# Patient Record
Sex: MALE | Race: WHITE | Employment: FULL TIME | URBAN - METROPOLITAN AREA
[De-identification: names, ages, dates, MRNs, and addresses within clinical notes are randomized per-mention and may not be internally consistent; named-entity substitution may affect disease eponyms.]

---

## 2017-01-27 ENCOUNTER — OFFICE VISIT (OUTPATIENT)
Dept: FAMILY MEDICINE CLINIC | Facility: CLINIC | Age: 57
End: 2017-01-27

## 2017-01-27 VITALS
SYSTOLIC BLOOD PRESSURE: 134 MMHG | TEMPERATURE: 99 F | BODY MASS INDEX: 32.78 KG/M2 | HEART RATE: 87 BPM | DIASTOLIC BLOOD PRESSURE: 77 MMHG | HEIGHT: 70 IN | WEIGHT: 229 LBS

## 2017-01-27 DIAGNOSIS — J01.00 ACUTE NON-RECURRENT MAXILLARY SINUSITIS: Primary | ICD-10-CM

## 2017-01-27 PROCEDURE — 99212 OFFICE O/P EST SF 10 MIN: CPT | Performed by: PHYSICIAN ASSISTANT

## 2017-01-27 PROCEDURE — 99213 OFFICE O/P EST LOW 20 MIN: CPT | Performed by: PHYSICIAN ASSISTANT

## 2017-01-27 RX ORDER — AMOXICILLIN AND CLAVULANATE POTASSIUM 875; 125 MG/1; MG/1
1 TABLET, FILM COATED ORAL 2 TIMES DAILY
Qty: 20 TABLET | Refills: 0 | Status: SHIPPED | OUTPATIENT
Start: 2017-01-27 | End: 2017-02-06

## 2017-01-27 RX ORDER — METHYLPREDNISOLONE 4 MG/1
TABLET ORAL
Qty: 1 KIT | Refills: 0 | Status: SHIPPED | OUTPATIENT
Start: 2017-01-27 | End: 2018-01-26 | Stop reason: ALTCHOICE

## 2017-01-27 NOTE — PROGRESS NOTES
HPI:    Patient ID: Jules Gonzales is a 55year old male. HPI Comments: Patient presents for sinus congestion, pressure in head and productive cough for past 3-4 days. Patient states has had fever, chills and body aches.   He denies dizziness, nausea, vo PHYSICAL EXAM:   Physical Exam   Constitutional: He is oriented to person, place, and time. He appears well-developed and well-nourished. No distress. HENT:   Head: Normocephalic and atraumatic.    Right Ear: Tympanic membrane and ear canal normal.   Sharron Overall

## 2017-12-04 RX ORDER — NEBIVOLOL HYDROCHLORIDE 10 MG/1
TABLET ORAL
Qty: 30 TABLET | Refills: 0 | Status: SHIPPED | OUTPATIENT
Start: 2017-12-04 | End: 2018-01-19

## 2017-12-04 NOTE — TELEPHONE ENCOUNTER
Signed Prescriptions Disp Refills    BYSTOLIC 10 MG Oral Tab 30 tablet 0      Sig: TAKE 1 TABLET BY MOUTH  DAILY        Authorizing Provider: Vicente Gonzales        Ordering User: Celso Mace           Refill approved per clinical decision.         H

## 2017-12-29 RX ORDER — LOSARTAN POTASSIUM AND HYDROCHLOROTHIAZIDE 12.5; 1 MG/1; MG/1
TABLET ORAL
Qty: 90 TABLET | Refills: 0 | Status: SHIPPED | OUTPATIENT
Start: 2017-12-29 | End: 2018-01-26

## 2018-01-23 RX ORDER — NEBIVOLOL HYDROCHLORIDE 10 MG/1
TABLET ORAL
Qty: 30 TABLET | Refills: 0 | Status: SHIPPED | OUTPATIENT
Start: 2018-01-23 | End: 2018-01-26

## 2018-01-26 ENCOUNTER — LAB ENCOUNTER (OUTPATIENT)
Dept: LAB | Age: 58
End: 2018-01-26
Attending: PHYSICIAN ASSISTANT
Payer: COMMERCIAL

## 2018-01-26 ENCOUNTER — OFFICE VISIT (OUTPATIENT)
Dept: FAMILY MEDICINE CLINIC | Facility: CLINIC | Age: 58
End: 2018-01-26

## 2018-01-26 ENCOUNTER — APPOINTMENT (OUTPATIENT)
Dept: LAB | Age: 58
End: 2018-01-26
Attending: PHYSICIAN ASSISTANT
Payer: COMMERCIAL

## 2018-01-26 VITALS
WEIGHT: 250 LBS | HEIGHT: 70 IN | HEART RATE: 58 BPM | SYSTOLIC BLOOD PRESSURE: 129 MMHG | DIASTOLIC BLOOD PRESSURE: 78 MMHG | BODY MASS INDEX: 35.79 KG/M2 | TEMPERATURE: 98 F

## 2018-01-26 DIAGNOSIS — E78.5 HYPERLIPIDEMIA, UNSPECIFIED HYPERLIPIDEMIA TYPE: ICD-10-CM

## 2018-01-26 DIAGNOSIS — I10 ESSENTIAL HYPERTENSION: ICD-10-CM

## 2018-01-26 DIAGNOSIS — Z00.00 ROUTINE GENERAL MEDICAL EXAMINATION AT A HEALTH CARE FACILITY: ICD-10-CM

## 2018-01-26 DIAGNOSIS — K21.9 GASTROESOPHAGEAL REFLUX DISEASE WITHOUT ESOPHAGITIS: ICD-10-CM

## 2018-01-26 DIAGNOSIS — Z00.00 ROUTINE GENERAL MEDICAL EXAMINATION AT A HEALTH CARE FACILITY: Primary | ICD-10-CM

## 2018-01-26 PROBLEM — E66.01 SEVERE OBESITY (BMI 35.0-35.9 WITH COMORBIDITY): Status: ACTIVE | Noted: 2018-01-26

## 2018-01-26 PROBLEM — J01.00 ACUTE NON-RECURRENT MAXILLARY SINUSITIS: Status: RESOLVED | Noted: 2017-01-27 | Resolved: 2018-01-26

## 2018-01-26 PROBLEM — G47.33 OBSTRUCTIVE SLEEP APNEA: Status: ACTIVE | Noted: 2018-01-26

## 2018-01-26 PROBLEM — E66.01 SEVERE OBESITY (BMI 35.0-35.9 WITH COMORBIDITY) (HCC): Status: ACTIVE | Noted: 2018-01-26

## 2018-01-26 PROBLEM — E66.01 SEVERE OBESITY (BMI 35.0-35.9 WITH COMORBIDITY)  (HCC): Status: ACTIVE | Noted: 2018-01-26

## 2018-01-26 LAB
ALBUMIN SERPL BCP-MCNC: 3.6 G/DL (ref 3.5–4.8)
ALBUMIN/GLOB SERPL: 1.1 {RATIO} (ref 1–2)
ALP SERPL-CCNC: 36 U/L (ref 32–100)
ALT SERPL-CCNC: 49 U/L (ref 17–63)
ANION GAP SERPL CALC-SCNC: 10 MMOL/L (ref 0–18)
AST SERPL-CCNC: 30 U/L (ref 15–41)
BASOPHILS # BLD: 0.1 K/UL (ref 0–0.2)
BASOPHILS NFR BLD: 1 %
BILIRUB SERPL-MCNC: 0.9 MG/DL (ref 0.3–1.2)
BUN SERPL-MCNC: 13 MG/DL (ref 8–20)
BUN/CREAT SERPL: 13.4 (ref 10–20)
CALCIUM SERPL-MCNC: 9.2 MG/DL (ref 8.5–10.5)
CHLORIDE SERPL-SCNC: 101 MMOL/L (ref 95–110)
CHOLEST SERPL-MCNC: 153 MG/DL (ref 110–200)
CO2 SERPL-SCNC: 26 MMOL/L (ref 22–32)
CREAT SERPL-MCNC: 0.97 MG/DL (ref 0.5–1.5)
EOSINOPHIL # BLD: 0.1 K/UL (ref 0–0.7)
EOSINOPHIL NFR BLD: 1 %
ERYTHROCYTE [DISTWIDTH] IN BLOOD BY AUTOMATED COUNT: 13.5 % (ref 11–15)
GLOBULIN PLAS-MCNC: 3.3 G/DL (ref 2.5–3.7)
GLUCOSE SERPL-MCNC: 97 MG/DL (ref 70–99)
HCT VFR BLD AUTO: 45 % (ref 41–52)
HDLC SERPL-MCNC: 31 MG/DL
HGB BLD-MCNC: 15 G/DL (ref 13.5–17.5)
LDLC SERPL CALC-MCNC: 94 MG/DL (ref 0–99)
LYMPHOCYTES # BLD: 1.6 K/UL (ref 1–4)
LYMPHOCYTES NFR BLD: 17 %
MCH RBC QN AUTO: 29.4 PG (ref 27–32)
MCHC RBC AUTO-ENTMCNC: 33.4 G/DL (ref 32–37)
MCV RBC AUTO: 88.1 FL (ref 80–100)
MONOCYTES # BLD: 1 K/UL (ref 0–1)
MONOCYTES NFR BLD: 10 %
NEUTROPHILS # BLD AUTO: 6.5 K/UL (ref 1.8–7.7)
NEUTROPHILS NFR BLD: 71 %
NONHDLC SERPL-MCNC: 122 MG/DL
OSMOLALITY UR CALC.SUM OF ELEC: 284 MOSM/KG (ref 275–295)
PLATELET # BLD AUTO: 237 K/UL (ref 140–400)
PMV BLD AUTO: 10 FL (ref 7.4–10.3)
POTASSIUM SERPL-SCNC: 3.8 MMOL/L (ref 3.3–5.1)
PROT SERPL-MCNC: 6.9 G/DL (ref 5.9–8.4)
PSA SERPL-MCNC: 3.2 NG/ML (ref 0–4)
RBC # BLD AUTO: 5.11 M/UL (ref 4.5–5.9)
SODIUM SERPL-SCNC: 137 MMOL/L (ref 136–144)
TRIGL SERPL-MCNC: 140 MG/DL (ref 1–149)
TSH SERPL-ACNC: 1.05 UIU/ML (ref 0.45–5.33)
WBC # BLD AUTO: 9.2 K/UL (ref 4–11)

## 2018-01-26 PROCEDURE — 93010 ELECTROCARDIOGRAM REPORT: CPT | Performed by: PHYSICIAN ASSISTANT

## 2018-01-26 PROCEDURE — 93005 ELECTROCARDIOGRAM TRACING: CPT

## 2018-01-26 PROCEDURE — 85025 COMPLETE CBC W/AUTO DIFF WBC: CPT

## 2018-01-26 PROCEDURE — 36415 COLL VENOUS BLD VENIPUNCTURE: CPT

## 2018-01-26 PROCEDURE — 99214 OFFICE O/P EST MOD 30 MIN: CPT | Performed by: PHYSICIAN ASSISTANT

## 2018-01-26 PROCEDURE — 99212 OFFICE O/P EST SF 10 MIN: CPT | Performed by: PHYSICIAN ASSISTANT

## 2018-01-26 PROCEDURE — 80061 LIPID PANEL: CPT

## 2018-01-26 PROCEDURE — 84443 ASSAY THYROID STIM HORMONE: CPT

## 2018-01-26 PROCEDURE — 80053 COMPREHEN METABOLIC PANEL: CPT

## 2018-01-26 RX ORDER — LOSARTAN POTASSIUM AND HYDROCHLOROTHIAZIDE 12.5; 1 MG/1; MG/1
1 TABLET ORAL
Qty: 90 TABLET | Refills: 4 | Status: SHIPPED | OUTPATIENT
Start: 2018-01-26 | End: 2019-02-20

## 2018-01-26 RX ORDER — NEBIVOLOL 10 MG/1
10 TABLET ORAL
Qty: 90 TABLET | Refills: 4 | Status: SHIPPED | OUTPATIENT
Start: 2018-01-26 | End: 2019-02-01

## 2018-01-26 RX ORDER — ALBUTEROL SULFATE 90 UG/1
AEROSOL, METERED RESPIRATORY (INHALATION)
Qty: 25.5 G | Refills: 3 | Status: SHIPPED | OUTPATIENT
Start: 2018-01-26 | End: 2019-02-20

## 2018-01-26 NOTE — PROGRESS NOTES
HPI:    Patient ID: Saima Ogden is a 65year old male. Patient presents for annual visit and medication refills. He takes Bystolic 10 mg and Losartan HCTZ 100/12.5 mg daily for hypertension. He denies any problems or side effects with medications. as  needed for wheezing Disp: 25.5 g Rfl: 3   Fexofenadine HCl (ALLEGRA ALLERGY) 180 MG Oral Tab Take 1 tablet (180 mg total) by mouth daily.  Disp: 90 tablet Rfl: 3   omeprazole 20 MG Oral Capsule Delayed Release Take 1 capsule (20 mg total) by mouth every hyperlipidemia type  -Fasting lipid panel ordered. Continue weight loss, healthy diet and regular exercise. Gastroesophageal reflux disease without esophagitis  -Continue omeprazole.       Orders Placed This Encounter      CBC W Differential W Platelet

## 2019-02-02 RX ORDER — NEBIVOLOL HYDROCHLORIDE 10 MG/1
TABLET ORAL
Qty: 90 TABLET | Refills: 0 | Status: SHIPPED | OUTPATIENT
Start: 2019-02-02 | End: 2019-05-17

## 2019-02-20 RX ORDER — ALBUTEROL SULFATE 90 UG/1
AEROSOL, METERED RESPIRATORY (INHALATION)
Qty: 25.5 G | Refills: 1 | Status: SHIPPED | OUTPATIENT
Start: 2019-02-20 | End: 2019-11-18

## 2019-02-20 RX ORDER — LOSARTAN POTASSIUM AND HYDROCHLOROTHIAZIDE 12.5; 1 MG/1; MG/1
1 TABLET ORAL
Qty: 90 TABLET | Refills: 0 | Status: SHIPPED | OUTPATIENT
Start: 2019-02-20 | End: 2019-05-17

## 2019-05-04 ENCOUNTER — HOSPITAL ENCOUNTER (OUTPATIENT)
Age: 59
Discharge: HOME | End: 2019-05-04
Payer: COMMERCIAL

## 2019-05-04 VITALS
TEMPERATURE: 99 F | WEIGHT: 250 LBS | HEART RATE: 74 BPM | OXYGEN SATURATION: 96 % | BODY MASS INDEX: 35 KG/M2 | DIASTOLIC BLOOD PRESSURE: 62 MMHG | SYSTOLIC BLOOD PRESSURE: 125 MMHG | HEIGHT: 71 IN | RESPIRATION RATE: 18 BRPM

## 2019-05-04 DIAGNOSIS — J01.10 ACUTE NON-RECURRENT FRONTAL SINUSITIS: Primary | ICD-10-CM

## 2019-05-04 PROCEDURE — 99214 OFFICE O/P EST MOD 30 MIN: CPT

## 2019-05-04 PROCEDURE — 99213 OFFICE O/P EST LOW 20 MIN: CPT

## 2019-05-04 RX ORDER — DOXYCYCLINE HYCLATE 100 MG/1
100 CAPSULE ORAL 2 TIMES DAILY
Qty: 20 CAPSULE | Refills: 0 | Status: SHIPPED | OUTPATIENT
Start: 2019-05-04 | End: 2019-05-14

## 2019-05-04 NOTE — ED PROVIDER NOTES
Patient presents with:  Cough/URI      HPI:     Erasmo Tiwari is a 63year old male who presents for evaluation and management of a chief complaint of frontal and maxillary sinus congestion, sinus discomfort, postnasal drip and a dry cough, and ear fullne phone: Not on file        Gets together: Not on file        Attends [employer] service: Not on file        Active member of club or organization: Not on file        Attends meetings of clubs or organizations: Not on file        Relationship status: Not on f wheezes    MDM/Assessment/Plan:   Orders for this encounter:  Orders Placed This Encounter      Doxycycline Hyclate 100 MG Oral Cap          Sig: Take 1 capsule (100 mg total) by mouth 2 (two) times daily for 10 days.           Dispense:  20 capsule

## 2019-05-18 RX ORDER — LOSARTAN POTASSIUM AND HYDROCHLOROTHIAZIDE 12.5; 1 MG/1; MG/1
1 TABLET ORAL
Qty: 90 TABLET | Refills: 0 | Status: SHIPPED | OUTPATIENT
Start: 2019-05-18 | End: 2019-11-18

## 2019-05-18 RX ORDER — NEBIVOLOL HYDROCHLORIDE 10 MG/1
TABLET ORAL
Qty: 90 TABLET | Refills: 0 | Status: SHIPPED | OUTPATIENT
Start: 2019-05-18 | End: 2019-11-18

## 2019-05-18 NOTE — TELEPHONE ENCOUNTER
Please review; protocol failed.     Requested Prescriptions     Pending Prescriptions Disp Refills   • BYSTOLIC 10 MG Oral Tab [Pharmacy Med Name: BYSTOLIC  49UT  TAB] 90 tablet 0     Sig: TAKE 1 TABLET BY MOUTH ONCE DAILY   • LOSARTAN POTASSIUM-HCTZ 100-12

## 2019-05-21 ENCOUNTER — HOSPITAL ENCOUNTER (OUTPATIENT)
Age: 59
Discharge: HOME | End: 2019-05-21
Payer: COMMERCIAL

## 2019-05-21 VITALS
WEIGHT: 250 LBS | RESPIRATION RATE: 20 BRPM | DIASTOLIC BLOOD PRESSURE: 61 MMHG | OXYGEN SATURATION: 97 % | HEART RATE: 64 BPM | TEMPERATURE: 98 F | BODY MASS INDEX: 35 KG/M2 | SYSTOLIC BLOOD PRESSURE: 155 MMHG | HEIGHT: 71 IN

## 2019-05-21 DIAGNOSIS — J01.40 ACUTE NON-RECURRENT PANSINUSITIS: Primary | ICD-10-CM

## 2019-05-21 PROCEDURE — 99214 OFFICE O/P EST MOD 30 MIN: CPT

## 2019-05-21 PROCEDURE — 99213 OFFICE O/P EST LOW 20 MIN: CPT

## 2019-05-21 RX ORDER — FLUTICASONE PROPIONATE 50 MCG
2 SPRAY, SUSPENSION (ML) NASAL DAILY
Qty: 16 G | Refills: 0 | Status: SHIPPED | OUTPATIENT
Start: 2019-05-21 | End: 2019-06-20

## 2019-05-21 RX ORDER — FLUTICASONE PROPIONATE 50 MCG
2 SPRAY, SUSPENSION (ML) NASAL DAILY
Qty: 16 G | Refills: 0 | Status: SHIPPED | OUTPATIENT
Start: 2019-05-21 | End: 2019-05-21

## 2019-05-21 RX ORDER — METHYLPREDNISOLONE 4 MG/1
TABLET ORAL
Qty: 1 PACKAGE | Refills: 0 | Status: SHIPPED | OUTPATIENT
Start: 2019-05-21 | End: 2019-05-26

## 2019-05-21 RX ORDER — AMOXICILLIN AND CLAVULANATE POTASSIUM 875; 125 MG/1; MG/1
1 TABLET, FILM COATED ORAL 2 TIMES DAILY
Qty: 20 TABLET | Refills: 0 | Status: SHIPPED | OUTPATIENT
Start: 2019-05-21 | End: 2019-05-31

## 2019-05-21 NOTE — ED PROVIDER NOTES
Patient presents with:  Cough/URI      HPI:     Jules Gonzales is a 63year old male with a past history of asthma, hyperlipidemia, hypertension seasonal allergies presents with a chief complaint of headache, facial pressure, runny nose, postnasal drip and for this encounter:  SPO2 97% RA which is normal.    Patient is well-appearing on examination, nontoxic appearance. Discussed with patient I do recommend him use Flonase daily which will help with postnasal drip.   Discussed with him that symptoms may be s

## 2019-09-03 NOTE — TELEPHONE ENCOUNTER
Advised patients wife that he will  Need to call and schedule a physical before we fill the medications.  Provided the information to his wife to call and do so tomorrow

## 2019-09-18 NOTE — TELEPHONE ENCOUNTER
CSS spoke with Pt and Pt states will callback in about 1 week to schedule appt and states not out of any meds

## 2019-09-26 RX ORDER — NEBIVOLOL HYDROCHLORIDE 10 MG/1
TABLET ORAL
Qty: 90 TABLET | Refills: 0 | OUTPATIENT
Start: 2019-09-26

## 2019-09-26 RX ORDER — LOSARTAN POTASSIUM AND HYDROCHLOROTHIAZIDE 12.5; 1 MG/1; MG/1
1 TABLET ORAL
Qty: 90 TABLET | Refills: 0 | OUTPATIENT
Start: 2019-09-26

## 2019-11-18 ENCOUNTER — OFFICE VISIT (OUTPATIENT)
Dept: INTERNAL MEDICINE CLINIC | Facility: CLINIC | Age: 59
End: 2019-11-18
Payer: COMMERCIAL

## 2019-11-18 VITALS
HEIGHT: 70.5 IN | HEART RATE: 63 BPM | DIASTOLIC BLOOD PRESSURE: 93 MMHG | SYSTOLIC BLOOD PRESSURE: 169 MMHG | BODY MASS INDEX: 37.54 KG/M2 | WEIGHT: 265.19 LBS

## 2019-11-18 DIAGNOSIS — I10 HYPERTENSION GOAL BP (BLOOD PRESSURE) < 130/80: ICD-10-CM

## 2019-11-18 DIAGNOSIS — Z00.00 ANNUAL PHYSICAL EXAM: Primary | ICD-10-CM

## 2019-11-18 DIAGNOSIS — G47.33 OSA ON CPAP: ICD-10-CM

## 2019-11-18 DIAGNOSIS — Z23 NEED FOR INFLUENZA VACCINATION: ICD-10-CM

## 2019-11-18 DIAGNOSIS — K29.00 ACUTE GASTRITIS WITHOUT HEMORRHAGE, UNSPECIFIED GASTRITIS TYPE: ICD-10-CM

## 2019-11-18 DIAGNOSIS — E78.5 DYSLIPIDEMIA: ICD-10-CM

## 2019-11-18 DIAGNOSIS — Z99.89 OSA ON CPAP: ICD-10-CM

## 2019-11-18 DIAGNOSIS — Z12.83 SKIN CANCER SCREENING: ICD-10-CM

## 2019-11-18 DIAGNOSIS — Z13.6 SCREENING, ISCHEMIC HEART DISEASE: ICD-10-CM

## 2019-11-18 DIAGNOSIS — J45.20 INTERMITTENT ASTHMA, UNSPECIFIED ASTHMA SEVERITY, UNSPECIFIED WHETHER COMPLICATED: ICD-10-CM

## 2019-11-18 PROCEDURE — 90471 IMMUNIZATION ADMIN: CPT | Performed by: INTERNAL MEDICINE

## 2019-11-18 PROCEDURE — 99386 PREV VISIT NEW AGE 40-64: CPT | Performed by: INTERNAL MEDICINE

## 2019-11-18 PROCEDURE — 90686 IIV4 VACC NO PRSV 0.5 ML IM: CPT | Performed by: INTERNAL MEDICINE

## 2019-11-18 RX ORDER — OMEPRAZOLE 20 MG/1
20 CAPSULE, DELAYED RELEASE ORAL EVERY MORNING
Qty: 90 CAPSULE | Refills: 3 | Status: SHIPPED | OUTPATIENT
Start: 2019-11-18 | End: 2021-07-16

## 2019-11-18 RX ORDER — LOSARTAN POTASSIUM AND HYDROCHLOROTHIAZIDE 12.5; 1 MG/1; MG/1
1 TABLET ORAL
Qty: 90 TABLET | Refills: 3 | Status: SHIPPED | OUTPATIENT
Start: 2019-11-18 | End: 2020-06-05 | Stop reason: ALTCHOICE

## 2019-11-18 RX ORDER — ALBUTEROL SULFATE 90 UG/1
AEROSOL, METERED RESPIRATORY (INHALATION)
Qty: 25.5 G | Refills: 1 | Status: SHIPPED | OUTPATIENT
Start: 2019-11-18 | End: 2021-07-16

## 2019-11-18 RX ORDER — NEBIVOLOL 10 MG/1
10 TABLET ORAL
Qty: 90 TABLET | Refills: 3 | Status: SHIPPED | OUTPATIENT
Start: 2019-11-18 | End: 2019-12-18

## 2019-11-18 RX ORDER — OMEGA-3 FATTY ACIDS CAP DELAYED RELEASE 1000 MG 1000 MG
2 CAPSULE DELAYED RELEASE ORAL
COMMUNITY

## 2019-11-18 NOTE — PROGRESS NOTES
HPI:    Patient ID: Hermilo Martin is a 63year old male. Chief Complaint: Physical (review/ refill meds. )    Here for annual exam. Feels well. No complaints. 1. Intermittent asthma- proair PRN, allegra- stable  2.  High cholesterol- not taking fenofibra Date Noted: 01/26/2018      Obstructive sleep apnea         Date Noted: 01/26/2018      Hypertension         Date Noted: 12/06/2016      Hyperlipidemia         Date Noted: 12/06/2016      Allergic rhinitis         Date Noted: 12/06/2016      GERD (g Eyes: Pupils are equal, round, and reactive to light. Conjunctivae and EOM are normal. Right eye exhibits no discharge. Left eye exhibits no discharge. Neck: Neck supple.    Cardiovascular: Normal rate, regular rhythm, normal heart sounds and normal pulse - DERM - INTERNAL  Plan  As above, numerous nevi     5. Dyslipidemia  Plan  The patient's ASCVD 10 year risk score is 15.1. Discussed statin therapy. We will recheck labs and if elevated start Crestor.     6. Screening, ischemic heart disease  - CT CALCIU Patient asked to sign release of information from prior physicians/outside consultants for review if not already requested, make future office/imaging appointments at the  prior to leaving, and to sign up for [employer] if not already active.   Preve Colorectal cancer All men in this age group Flexible sigmoidoscopy every 5 years, or colonoscopy every 10 years, or double-contrast barium enema every 5 years; yearly fecal occult blood test or fecal immunochemical test; or a stool DNA test as often as you Hepatitis A Men at increased risk for infection – talk with your healthcare provider 2 doses given at least 6 months apart   Hepatitis B Men at increased risk for infection – talk with your healthcare provider 3 doses over 6 months; second dose should be g [address] [place] [address] [facility]  Date Last Reviewed: 2/1/2017  © 2230-2905 [employer] [address]. [address] [facility] [place] [address] [place]. All rights reserved. This information is not intended as a substitute for professional medical care.  Alw

## 2019-11-23 ENCOUNTER — APPOINTMENT (OUTPATIENT)
Dept: LAB | Age: 59
End: 2019-11-23
Attending: INTERNAL MEDICINE
Payer: COMMERCIAL

## 2019-11-23 DIAGNOSIS — Z00.00 ANNUAL PHYSICAL EXAM: ICD-10-CM

## 2019-11-23 PROCEDURE — 85027 COMPLETE CBC AUTOMATED: CPT

## 2019-11-23 PROCEDURE — 83036 HEMOGLOBIN GLYCOSYLATED A1C: CPT

## 2019-11-23 PROCEDURE — 36415 COLL VENOUS BLD VENIPUNCTURE: CPT

## 2019-11-23 PROCEDURE — 80053 COMPREHEN METABOLIC PANEL: CPT

## 2019-11-23 PROCEDURE — 80061 LIPID PANEL: CPT

## 2019-11-28 DIAGNOSIS — E78.5 DYSLIPIDEMIA: Primary | ICD-10-CM

## 2019-11-28 RX ORDER — ROSUVASTATIN CALCIUM 5 MG/1
5 TABLET, COATED ORAL NIGHTLY
Qty: 90 TABLET | Refills: 1 | Status: SHIPPED | OUTPATIENT
Start: 2019-11-28 | End: 2020-05-15

## 2019-12-06 ENCOUNTER — TELEPHONE (OUTPATIENT)
Dept: INTERNAL MEDICINE CLINIC | Facility: CLINIC | Age: 59
End: 2019-12-06

## 2019-12-06 NOTE — TELEPHONE ENCOUNTER
Called patient regarding ct calcium was denied by insurance, please see below    Patient was told $75 out of pocket and concerned how much whole test will be, informed him to contact his insurance and hospital in regards to the cost because managed care on

## 2019-12-09 NOTE — TELEPHONE ENCOUNTER
Noted. Discussed with patient in office this is typically not covered by insurance and will likely be out of pocket. Informed him to wait for specials, can be as low at 35$ sometimes.    Triage: just fyi

## 2019-12-09 NOTE — TELEPHONE ENCOUNTER
S/W patient who wants to know where to call to confirm cost of heart scan. Gave patient [phone].

## 2019-12-16 ENCOUNTER — TELEPHONE (OUTPATIENT)
Dept: INTERNAL MEDICINE CLINIC | Facility: CLINIC | Age: 59
End: 2019-12-16

## 2019-12-16 NOTE — TELEPHONE ENCOUNTER
Faxed received requesting refill for the following medication. Please advise      •  Nebivolol HCl (BYSTOLIC) 10 MG Oral Tab, Take 1 tablet (10 mg total) by mouth once daily. , Disp: 90 tablet, Rfl: 3

## 2019-12-16 NOTE — TELEPHONE ENCOUNTER
Refill addressed, prescription was transferred to [facility] pharmacy on 11/23/2019 per patient request.      Nebivolol HCl (BYSTOLIC) 10 MG Oral Tab 90 tablet 3 11/18/2019     Sig - Route:  Take 1 tablet (10 mg total) by mouth once daily. - Oral    Sent to

## 2019-12-18 ENCOUNTER — TELEPHONE (OUTPATIENT)
Dept: FAMILY MEDICINE CLINIC | Facility: CLINIC | Age: 59
End: 2019-12-18

## 2019-12-18 DIAGNOSIS — I10 HYPERTENSION GOAL BP (BLOOD PRESSURE) < 130/80: ICD-10-CM

## 2019-12-18 RX ORDER — NEBIVOLOL 10 MG/1
10 TABLET ORAL
Qty: 90 TABLET | Refills: 3 | Status: SHIPPED | OUTPATIENT
Start: 2019-12-18 | End: 2020-06-05

## 2019-12-18 NOTE — TELEPHONE ENCOUNTER
Nebivolol HCl (BYSTOLIC) 10 MG Oral Tab, Take 1 tablet (10 mg total) by mouth once daily. , Disp: 90 tablet, Rfl: 3

## 2019-12-19 ENCOUNTER — HOSPITAL ENCOUNTER (OUTPATIENT)
Dept: CT IMAGING | Age: 59
Discharge: HOME | End: 2019-12-19
Attending: INTERNAL MEDICINE

## 2019-12-19 DIAGNOSIS — Z13.6 SCREENING, ISCHEMIC HEART DISEASE: ICD-10-CM

## 2019-12-22 ENCOUNTER — HOSPITAL ENCOUNTER (OUTPATIENT)
Age: 59
Discharge: HOME | End: 2019-12-22
Attending: EMERGENCY MEDICINE
Payer: COMMERCIAL

## 2019-12-22 VITALS
OXYGEN SATURATION: 97 % | SYSTOLIC BLOOD PRESSURE: 132 MMHG | DIASTOLIC BLOOD PRESSURE: 59 MMHG | WEIGHT: 255 LBS | RESPIRATION RATE: 18 BRPM | HEART RATE: 58 BPM | TEMPERATURE: 98 F | BODY MASS INDEX: 36 KG/M2

## 2019-12-22 DIAGNOSIS — J01.90 ACUTE SINUSITIS, RECURRENCE NOT SPECIFIED, UNSPECIFIED LOCATION: Primary | ICD-10-CM

## 2019-12-22 PROCEDURE — 99214 OFFICE O/P EST MOD 30 MIN: CPT

## 2019-12-22 PROCEDURE — 99213 OFFICE O/P EST LOW 20 MIN: CPT

## 2019-12-22 RX ORDER — AMOXICILLIN AND CLAVULANATE POTASSIUM 875; 125 MG/1; MG/1
1 TABLET, FILM COATED ORAL 2 TIMES DAILY
Qty: 20 TABLET | Refills: 0 | Status: SHIPPED | OUTPATIENT
Start: 2019-12-22 | End: 2020-01-01

## 2019-12-22 RX ORDER — METHYLPREDNISOLONE 4 MG/1
TABLET ORAL
Qty: 1 PACKAGE | Refills: 0 | Status: SHIPPED | OUTPATIENT
Start: 2019-12-22 | End: 2020-10-26 | Stop reason: ALTCHOICE

## 2019-12-22 NOTE — ED PROVIDER NOTES
Patient Seen in: [address]      History   Patient presents with:  Sinus Problem    Stated Complaint: sinus pain    HPI    This patient states he has been having sinus symptoms for the past week of nasal congestion cough clear to auscultation  Cardiac there are normal first and second heart sounds  Neurologic there are no gross cranial nerve deficits patient was all 4 extremities without impairment            MDM     Patient stated previous sinus  treatment months ago was

## 2019-12-22 NOTE — ED INITIAL ASSESSMENT (HPI)
PATIENT ARRIVED AMBULATORY TO ROOM C/O SYMPTOMS THAT STARTED 1 WEEK AGO. +NASAL CONGESTION. +SINUS PRESSURE/FACIAL PAIN. +COUGH. EASY NON LABORED RESPIRATIONS. NO FEVERS.

## 2020-01-03 ENCOUNTER — HOSPITAL ENCOUNTER (OUTPATIENT)
Dept: ULTRASOUND IMAGING | Age: 60
Discharge: HOME | End: 2020-01-03
Attending: INTERNAL MEDICINE

## 2020-01-03 DIAGNOSIS — Z13.9 ENCOUNTER FOR SCREENING: ICD-10-CM

## 2020-03-12 ENCOUNTER — NURSE TRIAGE (OUTPATIENT)
Dept: INTERNAL MEDICINE CLINIC | Facility: CLINIC | Age: 60
End: 2020-03-12

## 2020-03-12 DIAGNOSIS — Z78.9 HISTORY OF RECENT TRAVEL: ICD-10-CM

## 2020-03-12 DIAGNOSIS — J45.31 MILD PERSISTENT ASTHMA WITH ACUTE EXACERBATION: ICD-10-CM

## 2020-03-12 DIAGNOSIS — J01.90 ACUTE BACTERIAL SINUSITIS: Primary | ICD-10-CM

## 2020-03-12 DIAGNOSIS — B96.89 ACUTE BACTERIAL SINUSITIS: Primary | ICD-10-CM

## 2020-03-12 NOTE — TELEPHONE ENCOUNTER
Action Requested: Summary for Provider     []  Critical Lab, Recommendations Needed  [] Need Additional Advice  []   FYI    []   Need Orders  [] Need Medications Sent to Pharmacy  []  Other     SUMMARY: Spoke with the patient who reports Since Monday he ha

## 2020-03-13 RX ORDER — METHYLPREDNISOLONE 4 MG/1
TABLET ORAL
Qty: 1 PACKAGE | Refills: 0 | Status: SHIPPED | OUTPATIENT
Start: 2020-03-13 | End: 2020-06-05 | Stop reason: ALTCHOICE

## 2020-03-13 RX ORDER — AMOXICILLIN AND CLAVULANATE POTASSIUM 875; 125 MG/1; MG/1
1 TABLET, FILM COATED ORAL 2 TIMES DAILY WITH MEALS
Qty: 20 TABLET | Refills: 0 | Status: SHIPPED | OUTPATIENT
Start: 2020-03-13 | End: 2020-03-23

## 2020-03-13 NOTE — TELEPHONE ENCOUNTER
Triage note reviewed prior to visit. Pt noted to have systemic symptoms. Spoke with patient via phone.    Past few days and worsening today- nasal congestion, coughing up some mucous which is worse than yesterday, subjective fever, congestion in sinuses,

## 2020-03-16 ENCOUNTER — TELEPHONE (OUTPATIENT)
Dept: INTERNAL MEDICINE CLINIC | Facility: CLINIC | Age: 60
End: 2020-03-16

## 2020-03-16 NOTE — TELEPHONE ENCOUNTER
Testing is out of my hands and is entirely up to [facility]. He should follow CDC/work guidelines, ideally off 14 days but ultimately up to his work when he can go back.

## 2020-03-16 NOTE — TELEPHONE ENCOUNTER
Dr. Morgan Mckenzie:   Patient at this point frustrated because he cannot get tested for COVID 19. He was treated at home (see 3/12/20) encounter. Given augmentin. Has been feeling better. No fever. Cough is better, once in a while coughing. No sob.

## 2020-03-17 NOTE — TELEPHONE ENCOUNTER
Informed patient of Dr. Yehuda Howe advice as per below. Patient states his employer does not want employee's showing up to work if not feeling well.   Due to not being able to test for Covid19, patient asking if Dr. Raul Rodriguez can write him a note to be off of wor

## 2020-03-17 NOTE — TELEPHONE ENCOUNTER
Please provide him with the generic [facility] letter, if his work needs something specific please ask him what it should say and I will send him a personal one. Thanks.

## 2020-05-14 DIAGNOSIS — E78.5 DYSLIPIDEMIA: ICD-10-CM

## 2020-05-15 RX ORDER — ROSUVASTATIN CALCIUM 5 MG/1
TABLET, COATED ORAL
Qty: 90 TABLET | Refills: 3 | Status: SHIPPED | OUTPATIENT
Start: 2020-05-15 | End: 2020-07-10

## 2020-05-27 ENCOUNTER — TELEPHONE (OUTPATIENT)
Dept: FAMILY MEDICINE CLINIC | Facility: CLINIC | Age: 60
End: 2020-05-27

## 2020-05-27 DIAGNOSIS — L91.8 SKIN TAG: Primary | ICD-10-CM

## 2020-05-27 DIAGNOSIS — J45.20 INTERMITTENT ASTHMA WITHOUT COMPLICATION, UNSPECIFIED ASTHMA SEVERITY: Primary | ICD-10-CM

## 2020-05-27 RX ORDER — ALBUTEROL SULFATE 90 UG/1
2 AEROSOL, METERED RESPIRATORY (INHALATION) EVERY 4 HOURS PRN
Qty: 2 INHALER | Refills: 11 | Status: SHIPPED | OUTPATIENT
Start: 2020-05-27 | End: 2021-07-16

## 2020-05-27 NOTE — TELEPHONE ENCOUNTER
Albuterol refilled, please have him schedule 6 month follow up virtual visit to discuss medications/titrate if necessary. Should start checking blood pressure twice daily if not already.

## 2020-05-27 NOTE — TELEPHONE ENCOUNTER
Patient called and asked for a referral to a dermatologist.     He has these \"skin tags\" that are in unusual places and they are bothering him now. He would like to have them looked.       Please Advise

## 2020-05-28 NOTE — TELEPHONE ENCOUNTER
Spoke with pt informed of Dr. Kiesha Lieberman note below and he voiced understanding. Pt will monitor bp reading and discuss with MD at appt. Pt stts he has enough bp meds for now.

## 2020-05-28 NOTE — TELEPHONE ENCOUNTER
Pt stts he went to [facility] and was able to find a dermatologist close to him. No further action needed.

## 2020-06-05 ENCOUNTER — TELEMEDICINE (OUTPATIENT)
Dept: INTERNAL MEDICINE CLINIC | Facility: CLINIC | Age: 60
End: 2020-06-05
Payer: COMMERCIAL

## 2020-06-05 DIAGNOSIS — E78.5 HYPERLIPIDEMIA, UNSPECIFIED HYPERLIPIDEMIA TYPE: ICD-10-CM

## 2020-06-05 DIAGNOSIS — I10 HYPERTENSION GOAL BP (BLOOD PRESSURE) < 130/80: Primary | ICD-10-CM

## 2020-06-05 PROCEDURE — 99214 OFFICE O/P EST MOD 30 MIN: CPT | Performed by: INTERNAL MEDICINE

## 2020-06-05 RX ORDER — LOSARTAN POTASSIUM 100 MG/1
100 TABLET ORAL NIGHTLY
Qty: 90 TABLET | Refills: 3 | Status: SHIPPED | OUTPATIENT
Start: 2020-06-05 | End: 2020-09-03 | Stop reason: WASHOUT

## 2020-06-05 RX ORDER — NEBIVOLOL 10 MG/1
10 TABLET ORAL
Qty: 90 TABLET | Refills: 3 | Status: SHIPPED | OUTPATIENT
Start: 2020-06-05 | End: 2021-06-24

## 2020-06-05 RX ORDER — CHLORTHALIDONE 25 MG/1
25 TABLET ORAL DAILY
Qty: 90 TABLET | Refills: 1 | Status: SHIPPED | OUTPATIENT
Start: 2020-06-05 | End: 2021-07-16

## 2020-06-05 NOTE — PROGRESS NOTES
Telehealth outside of [address] Verbal Consent   I conducted a [facility] visit [facility], 06/05/20, which was completed using two-way, real-time interactive audio and video communication.  This has been done in good keila to pro palpitations, peripheral edema or shortness of breath. Risk factors for coronary artery disease include male gender, dyslipidemia and family history. Past treatments include beta blockers, angiotensin blockers and diuretics.  The current treatment provides Beer;     Drug use: No     Reviewed Current Medications:  Current Outpatient Medications   Medication Sig Dispense Refill   • Nebivolol HCl (BYSTOLIC) 10 MG Oral Tab Take 1 tablet (10 mg total) by mouth once daily.  90 tablet 3   • losartan Potassium 100 MG distress. He has no wheezes (with normal respiration nor forced expiration). Abdominal: Normal appearance. Neurological: He is alert and oriented to person, place, and time. Skin: Skin is intact. No rash noted.    Psychiatric: He has a normal mood and

## 2020-06-05 NOTE — PATIENT INSTRUCTIONS
1. Please purchase a blood pressure monitor, if you don't already own one, and record your blood pressure and heart rate 1-2 times daily on the provided log.  The more values you provide at the end of the month the easier it will be to adjust your medic High blood pressure occurs when blood pushes too hard against artery walls. This causes damage to the artery walls and then the formation of scar tissue as it heals. This makes the arteries stiff and weak.  Plaque sticks to the scarred tissue narrowing and An example of a blood pressure measurement is 120/70 (120 over 70). The top number is the pressure of blood against the artery walls during a heartbeat (systolic).  The bottom number is the pressure of blood against artery walls between heartbeats (diastoli · Limit alcohol. Drinking too much alcohol can raise blood pressure. Men should have no more than 2 drinks a day. Women should have no more than 1. (A drink is equal to 1 beer, or a small glass of wine, or a shot of liquor.)  · Control stress.  Stress makes Blood pressure measurements are given as 2 numbers. Systolic blood pressure is the upper number. This is the pressure when the heart contracts. Diastolic blood pressure is the lower number. This is the pressure when the heart relaxes between beats.  You [place] · Start an exercise program. Talk with your healthcare provider about what exercise program is best for you. It doesn’t have to be difficult. Even brisk walking for 20 minutes 3 times a week is a good form of exercise.   · Avoid medicines that stimulates th · Relax for 5 minutes before taking the measurement. · Sit correctly. Be sure your back is supported. Don't sit on a couch or soft chair. Uncross your feet and place them flat on the floor.  Place your arm on a solid, flat surface like a table with the upp · Dizziness or dizziness with spinning sensation (vertigo)  · Weakness in an arm or leg or on one side of the face  · Trouble speaking or seeing   Controlling High Blood Pressure  High blood pressure (hypertension) is often called the silent killer.  This i · When you go to a restaurant, ask that your meal be prepared with no added salt. Maintain a healthy weight  · Ask your healthcare provider how many calories to eat a day. Then stick to that number.   · Ask your healthcare provider what weight range is h Eating salt (sodium) can make your body retain too much water. Excess water makes your heart work harder. Canned, packaged, and frozen foods are easy to prepare. But they are often high in sodium.  Here are some ideas for low-salt foods you can easily make

## 2020-06-19 ENCOUNTER — TELEMEDICINE (OUTPATIENT)
Dept: INTERNAL MEDICINE CLINIC | Facility: CLINIC | Age: 60
End: 2020-06-19
Payer: COMMERCIAL

## 2020-06-19 VITALS — SYSTOLIC BLOOD PRESSURE: 135 MMHG | DIASTOLIC BLOOD PRESSURE: 72 MMHG

## 2020-06-19 DIAGNOSIS — I10 HYPERTENSION GOAL BP (BLOOD PRESSURE) < 130/80: Primary | ICD-10-CM

## 2020-06-19 PROCEDURE — 99213 OFFICE O/P EST LOW 20 MIN: CPT | Performed by: INTERNAL MEDICINE

## 2020-06-19 NOTE — PROGRESS NOTES
Telehealth outside of [address] Verbal Consent   I conducted a telehealth visit with [facility] today, 06/19/20, which was completed using two-way, real-time interactive audio and video communication.  This has been done in good keila to pro ago. The problem is controlled (now primarily in the 110-120/70-80s). Pertinent negatives include no chest pain, headaches, palpitations, peripheral edema or shortness of breath. Risk factors for coronary artery disease include male gender and obesity.  Pas Nebivolol HCl (BYSTOLIC) 10 MG Oral Tab Take 1 tablet (10 mg total) by mouth once daily. 90 tablet 3   • losartan Potassium 100 MG Oral Tab Take 1 tablet (100 mg total) by mouth nightly. FOR BLOOD PRESSURE.  90 tablet 3   • chlorthalidone 25 MG Oral Tab Yoly tOto and affect. His speech is normal and behavior is normal.     Diagnosis:  1. Hypertension goal BP (blood pressure) < 130/80  Plan  His showed significant improvement with re-titration of blood pressure medications. Continue losartan 100 mg nightly.   Contin

## 2020-07-10 DIAGNOSIS — E78.5 DYSLIPIDEMIA: ICD-10-CM

## 2020-07-10 RX ORDER — ROSUVASTATIN CALCIUM 5 MG/1
5 TABLET, COATED ORAL NIGHTLY
Qty: 90 TABLET | Refills: 3 | OUTPATIENT
Start: 2020-07-10

## 2020-07-10 NOTE — TELEPHONE ENCOUNTER
This is being sent to you without review by the Triage staff due to the high call volumes created by the COVID-19 virus, per the email sent by Dr. Chiara Lassiter on 3/19/20. Thank you for your support.     Centralized Nurse Triage Team

## 2020-07-10 NOTE — TELEPHONE ENCOUNTER
Pt would like a refill on his rosuvastatin medication.  Per pt this is going to a new pharmacy: pharmacy: [facility] (listed)     Current Outpatient Medications   Medication Sig Dispense Refill   • ROSUVASTATIN CALCIUM 5 MG Oral Tab TAKE 1 T

## 2020-07-11 NOTE — TELEPHONE ENCOUNTER
Patient is calling to follow up on status of refill request for medication ROSUVASTATIN CALCIUM 5 MG Oral Tab. Patient states he has 5/6 tablets left.

## 2020-07-13 RX ORDER — ROSUVASTATIN CALCIUM 5 MG/1
5 TABLET, COATED ORAL NIGHTLY
Qty: 90 TABLET | Refills: 3 | Status: SHIPPED | OUTPATIENT
Start: 2020-07-13 | End: 2021-07-16

## 2020-10-26 ENCOUNTER — TELEMEDICINE (OUTPATIENT)
Dept: FAMILY MEDICINE CLINIC | Facility: CLINIC | Age: 60
End: 2020-10-26
Payer: COMMERCIAL

## 2020-10-26 ENCOUNTER — NURSE TRIAGE (OUTPATIENT)
Dept: INTERNAL MEDICINE CLINIC | Facility: CLINIC | Age: 60
End: 2020-10-26

## 2020-10-26 DIAGNOSIS — J32.4 PANSINUSITIS, UNSPECIFIED CHRONICITY: Primary | ICD-10-CM

## 2020-10-26 PROCEDURE — 99213 OFFICE O/P EST LOW 20 MIN: CPT | Performed by: NURSE PRACTITIONER

## 2020-10-26 RX ORDER — AMOXICILLIN AND CLAVULANATE POTASSIUM 875; 125 MG/1; MG/1
1 TABLET, FILM COATED ORAL 2 TIMES DAILY
Qty: 20 TABLET | Refills: 0 | Status: SHIPPED | OUTPATIENT
Start: 2020-10-26 | End: 2020-11-05
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
## 2020-10-26 NOTE — TELEPHONE ENCOUNTER
Action Requested: Summary for Provider     []  Critical Lab, Recommendations Needed  [] Need Additional Advice  []   FYI    []   Need Orders  [] Need Medications Sent to Pharmacy  []  Other     SUMMARY: Per protocol advised virtual appt. Scheduled with BLAKE

## 2021-06-19 DIAGNOSIS — I10 HYPERTENSION GOAL BP (BLOOD PRESSURE) < 130/80: ICD-10-CM

## 2021-06-21 NOTE — TELEPHONE ENCOUNTER
Please schedule epic video or office visit (assuming feeling well) to discuss this medication, thanks. Track BP daily.

## 2021-06-22 ENCOUNTER — TELEPHONE (OUTPATIENT)
Dept: INTERNAL MEDICINE CLINIC | Facility: CLINIC | Age: 61
End: 2021-06-22

## 2021-06-22 NOTE — TELEPHONE ENCOUNTER
Patient states he has been fully vaccinated for the COVID-19.  I have received the Villarrela Peter shots on 2/11/21 and 3/5/21 and this was done at the [facility])

## 2021-06-23 NOTE — TELEPHONE ENCOUNTER
Future Appointments   Date Time Provider Marisela Gomez   7/16/2021  1:00 PM Reyes Corn, MD [facility]

## 2021-06-24 RX ORDER — NEBIVOLOL 10 MG/1
10 TABLET ORAL DAILY
Qty: 90 TABLET | Refills: 0 | Status: SHIPPED | OUTPATIENT
Start: 2021-06-24 | End: 2021-07-16

## 2021-07-16 ENCOUNTER — OFFICE VISIT (OUTPATIENT)
Dept: INTERNAL MEDICINE CLINIC | Facility: CLINIC | Age: 61
End: 2021-07-16
Payer: COMMERCIAL

## 2021-07-16 ENCOUNTER — LAB ENCOUNTER (OUTPATIENT)
Dept: LAB | Age: 61
End: 2021-07-16
Attending: INTERNAL MEDICINE
Payer: COMMERCIAL

## 2021-07-16 VITALS
BODY MASS INDEX: 37.68 KG/M2 | HEIGHT: 70.5 IN | WEIGHT: 266.19 LBS | SYSTOLIC BLOOD PRESSURE: 173 MMHG | TEMPERATURE: 97 F | DIASTOLIC BLOOD PRESSURE: 85 MMHG | HEART RATE: 66 BPM

## 2021-07-16 DIAGNOSIS — Z12.11 COLON CANCER SCREENING: ICD-10-CM

## 2021-07-16 DIAGNOSIS — K29.00 ACUTE GASTRITIS WITHOUT HEMORRHAGE, UNSPECIFIED GASTRITIS TYPE: ICD-10-CM

## 2021-07-16 DIAGNOSIS — E78.2 MIXED HYPERLIPIDEMIA: ICD-10-CM

## 2021-07-16 DIAGNOSIS — I10 HYPERTENSION GOAL BP (BLOOD PRESSURE) < 130/80: ICD-10-CM

## 2021-07-16 DIAGNOSIS — M62.830 SPASM OF MUSCLE OF LOWER BACK: ICD-10-CM

## 2021-07-16 DIAGNOSIS — Z23 NEED FOR SHINGLES VACCINE: ICD-10-CM

## 2021-07-16 DIAGNOSIS — Z00.00 ANNUAL PHYSICAL EXAM: ICD-10-CM

## 2021-07-16 DIAGNOSIS — J45.20 INTERMITTENT ASTHMA WITHOUT COMPLICATION, UNSPECIFIED ASTHMA SEVERITY: ICD-10-CM

## 2021-07-16 DIAGNOSIS — Z23 NEED FOR PNEUMOCOCCAL VACCINATION: ICD-10-CM

## 2021-07-16 DIAGNOSIS — Z00.00 ANNUAL PHYSICAL EXAM: Primary | ICD-10-CM

## 2021-07-16 LAB
ALBUMIN SERPL-MCNC: 3.6 G/DL (ref 3.4–5)
ALBUMIN/GLOB SERPL: 1 {RATIO} (ref 1–2)
ALP LIVER SERPL-CCNC: 50 U/L
ALT SERPL-CCNC: 52 U/L
ANION GAP SERPL CALC-SCNC: 4 MMOL/L (ref 0–18)
AST SERPL-CCNC: 21 U/L (ref 15–37)
BILIRUB SERPL-MCNC: 0.9 MG/DL (ref 0.1–2)
BUN BLD-MCNC: 15 MG/DL (ref 7–18)
BUN/CREAT SERPL: 17.6 (ref 10–20)
CALCIUM BLD-MCNC: 8.8 MG/DL (ref 8.5–10.1)
CHLORIDE SERPL-SCNC: 106 MMOL/L (ref 98–112)
CHOLEST SMN-MCNC: 100 MG/DL (ref ?–200)
CO2 SERPL-SCNC: 29 MMOL/L (ref 21–32)
COMPLEXED PSA SERPL-MCNC: 2.63 NG/ML (ref ?–4)
CREAT BLD-MCNC: 0.85 MG/DL
DEPRECATED RDW RBC AUTO: 43.1 FL (ref 35.1–46.3)
ERYTHROCYTE [DISTWIDTH] IN BLOOD BY AUTOMATED COUNT: 13 % (ref 11–15)
EST. AVERAGE GLUCOSE BLD GHB EST-MCNC: 126 MG/DL (ref 68–126)
GLOBULIN PLAS-MCNC: 3.5 G/DL (ref 2.8–4.4)
GLUCOSE BLD-MCNC: 71 MG/DL (ref 70–99)
HBA1C MFR BLD HPLC: 6 % (ref ?–5.7)
HCT VFR BLD AUTO: 42.6 %
HDLC SERPL-MCNC: 37 MG/DL (ref 40–59)
HGB BLD-MCNC: 14 G/DL
LDLC SERPL CALC-MCNC: 40 MG/DL (ref ?–100)
M PROTEIN MFR SERPL ELPH: 7.1 G/DL (ref 6.4–8.2)
MCH RBC QN AUTO: 30 PG (ref 26–34)
MCHC RBC AUTO-ENTMCNC: 32.9 G/DL (ref 31–37)
MCV RBC AUTO: 91.2 FL
NONHDLC SERPL-MCNC: 63 MG/DL (ref ?–130)
OSMOLALITY SERPL CALC.SUM OF ELEC: 287 MOSM/KG (ref 275–295)
PATIENT FASTING Y/N/NP: YES
PATIENT FASTING Y/N/NP: YES
PLATELET # BLD AUTO: 227 10(3)UL (ref 150–450)
POTASSIUM SERPL-SCNC: 4 MMOL/L (ref 3.5–5.1)
RBC # BLD AUTO: 4.67 X10(6)UL
SODIUM SERPL-SCNC: 139 MMOL/L (ref 136–145)
TRIGL SERPL-MCNC: 128 MG/DL (ref 30–149)
VLDLC SERPL CALC-MCNC: 18 MG/DL (ref 0–30)
WBC # BLD AUTO: 10.4 X10(3) UL (ref 4–11)

## 2021-07-16 PROCEDURE — 99396 PREV VISIT EST AGE 40-64: CPT | Performed by: INTERNAL MEDICINE

## 2021-07-16 PROCEDURE — 3079F DIAST BP 80-89 MM HG: CPT | Performed by: INTERNAL MEDICINE

## 2021-07-16 PROCEDURE — 3077F SYST BP >= 140 MM HG: CPT | Performed by: INTERNAL MEDICINE

## 2021-07-16 PROCEDURE — 90732 PPSV23 VACC 2 YRS+ SUBQ/IM: CPT | Performed by: INTERNAL MEDICINE

## 2021-07-16 PROCEDURE — 83036 HEMOGLOBIN GLYCOSYLATED A1C: CPT

## 2021-07-16 PROCEDURE — 80053 COMPREHEN METABOLIC PANEL: CPT

## 2021-07-16 PROCEDURE — 36415 COLL VENOUS BLD VENIPUNCTURE: CPT

## 2021-07-16 PROCEDURE — 90750 HZV VACC RECOMBINANT IM: CPT | Performed by: INTERNAL MEDICINE

## 2021-07-16 PROCEDURE — 85027 COMPLETE CBC AUTOMATED: CPT

## 2021-07-16 PROCEDURE — 80061 LIPID PANEL: CPT

## 2021-07-16 PROCEDURE — 3008F BODY MASS INDEX DOCD: CPT | Performed by: INTERNAL MEDICINE

## 2021-07-16 PROCEDURE — 90471 IMMUNIZATION ADMIN: CPT | Performed by: INTERNAL MEDICINE

## 2021-07-16 PROCEDURE — 90472 IMMUNIZATION ADMIN EACH ADD: CPT | Performed by: INTERNAL MEDICINE

## 2021-07-16 RX ORDER — LOSARTAN POTASSIUM 100 MG/1
TABLET ORAL
COMMUNITY
Start: 2021-05-17 | End: 2021-07-16

## 2021-07-16 RX ORDER — CYCLOBENZAPRINE HCL 5 MG
TABLET ORAL
Qty: 30 TABLET | Refills: 2 | Status: SHIPPED | OUTPATIENT
Start: 2021-07-16

## 2021-07-16 RX ORDER — NEBIVOLOL 10 MG/1
10 TABLET ORAL DAILY
Qty: 90 TABLET | Refills: 3 | Status: SHIPPED | OUTPATIENT
Start: 2021-07-16 | End: 2021-08-13

## 2021-07-16 RX ORDER — ROSUVASTATIN CALCIUM 5 MG/1
5 TABLET, COATED ORAL NIGHTLY
Qty: 90 TABLET | Refills: 3 | Status: SHIPPED | OUTPATIENT
Start: 2021-07-16

## 2021-07-16 RX ORDER — ALBUTEROL SULFATE 90 UG/1
2 AEROSOL, METERED RESPIRATORY (INHALATION) EVERY 4 HOURS PRN
Qty: 3 EACH | Refills: 3 | Status: SHIPPED | OUTPATIENT
Start: 2021-07-16

## 2021-07-16 RX ORDER — LOSARTAN POTASSIUM 100 MG/1
100 TABLET ORAL DAILY
Qty: 90 TABLET | Refills: 3 | Status: SHIPPED | OUTPATIENT
Start: 2021-07-16 | End: 2021-08-13

## 2021-07-16 RX ORDER — OMEPRAZOLE 20 MG/1
20 CAPSULE, DELAYED RELEASE ORAL EVERY MORNING
Qty: 90 CAPSULE | Refills: 3 | Status: SHIPPED | OUTPATIENT
Start: 2021-07-16

## 2021-07-16 RX ORDER — CHLORTHALIDONE 25 MG/1
25 TABLET ORAL DAILY
Qty: 90 TABLET | Refills: 3 | Status: SHIPPED | OUTPATIENT
Start: 2021-07-16 | End: 2021-08-13

## 2021-07-16 NOTE — PROGRESS NOTES
History of Present Illness   Patient ID: Sherry Yost is a 62year old male. Chief Complaint: Physical      Sherry Yost is a pleasant 62year old male who presents for annual physical exam. Sherry Yost is doing well today.   Medications revie Pulmonary:      Effort: Pulmonary effort is normal.      Breath sounds: Normal breath sounds. Abdominal:      Palpations: Abdomen is soft. Musculoskeletal:         General: Normal range of motion.       Cervical back: Normal range of motion and neck sup : No      Diabetic: No      Tobacco smoker: No      Systolic Blood Pressure: 539 mmHg      Is BP treated: Yes      HDL Cholesterol: 34 mg/dL      Total Cholesterol: 138 mg/dL    Medical History    Reviewed allergies:  No Known Allergies Rosuvastatin Calcium 5 MG Oral Tab Take 1 tablet (5 mg total) by mouth nightly. FOR CHOLESTEROL. 90 tablet 3   • omeprazole 20 MG Oral Capsule Delayed Release Take 1 capsule (20 mg total) by mouth every morning.  90 capsule 3   • cyclobenzaprine 5 MG Oral T Inhalation Aero Soln; Inhale 2 puffs into the lungs every 4 (four) hours as needed for Wheezing or Shortness of Breath. Dispense: 3 each; Refill: 3    7. Spasm of muscle of lower back  - cyclobenzaprine 5 MG Oral Tab;  Take 2.5mg - 5mg every 8 hours as nee cyclobenzaprine 5 MG Oral Tab 30 tablet 2     Sig: Take 2.5mg - 5mg every 8 hours as needed for muscle spasms. May cause sedation; no driving. Maximum dose: 10mg every 8 hours.        Patient asked to sign release of information for outside records if not a called hypertension) is known as the “silent killer.” This is because most of the time it doesn’t cause symptoms. In fact, many people don’t know they have it until other problems develop.  In most cases, high blood pressure often requires lifelong treatmen stage 1 or stage 2 high blood pressure:  · Normal blood pressure is systolic of less than 752 and diastolic of less than 80 (120/80)  · Elevated blood pressure is systolic of 537 to 152 and diastolic less than 80  · Stage 1 high blood pressure is systolic the AHA says a positive change can occur by cutting back to even 2,400 milligrams of sodium a day. · Maintain a healthy weight. Being overweight makes you more likely to have high blood pressure. Losing excess weight helps lower blood pressure.   · Exercis diet  · Smoking  · Caffeine  Your blood pressure can also rise if you are emotionally upset or in intense pain. It may go back to normal after a period of rest.  Blood pressure measurements are given as 2 numbers.  Systolic blood pressure is the upper numbe only small amounts of salt when cooking. · Start an exercise program. Talk with your healthcare provider about what exercise program is best for you. It doesn’t have to be difficult.  Even brisk walking for 20 minutes 3 times a week is a good form of exerc minutes  · Go to the bathroom before the test.  · Relax for 5 minutes before taking the measurement. · Sit correctly. Be sure your back is supported. Don't sit on a couch or soft chair. Uncross your feet and place them flat on the floor.  Place your arm on Extreme drowsiness, confusion, or fainting  · Dizziness or dizziness with spinning sensation (vertigo)  · Weakness in an arm or leg or on one side of the face  · Trouble speaking or seeing   Controlling High Blood Pressure  High blood pressure (hypertensio restaurant, ask that your meal be prepared with no added salt. Maintain a healthy weight  · Ask your healthcare provider how many calories to eat a day. Then stick to that number. · Ask your healthcare provider what weight range is healthiest for you. and frozen foods are easy to prepare. But they are often high in sodium. Here are some ideas for low-salt foods you can easily make yourself. For breakfast  · Fruit or 100% fruit juice  · Whole-wheat bread or an English muffin.  Look for sodium content on

## 2021-08-13 ENCOUNTER — OFFICE VISIT (OUTPATIENT)
Dept: INTERNAL MEDICINE CLINIC | Facility: CLINIC | Age: 61
End: 2021-08-13
Payer: COMMERCIAL

## 2021-08-13 VITALS
DIASTOLIC BLOOD PRESSURE: 70 MMHG | WEIGHT: 260 LBS | BODY MASS INDEX: 37.22 KG/M2 | SYSTOLIC BLOOD PRESSURE: 135 MMHG | TEMPERATURE: 98 F | HEART RATE: 67 BPM | HEIGHT: 70 IN | RESPIRATION RATE: 12 BRPM

## 2021-08-13 DIAGNOSIS — I10 HYPERTENSION GOAL BP (BLOOD PRESSURE) < 130/80: Primary | ICD-10-CM

## 2021-08-13 PROCEDURE — 3008F BODY MASS INDEX DOCD: CPT | Performed by: INTERNAL MEDICINE

## 2021-08-13 PROCEDURE — 3078F DIAST BP <80 MM HG: CPT | Performed by: INTERNAL MEDICINE

## 2021-08-13 PROCEDURE — 99214 OFFICE O/P EST MOD 30 MIN: CPT | Performed by: INTERNAL MEDICINE

## 2021-08-13 PROCEDURE — 3075F SYST BP GE 130 - 139MM HG: CPT | Performed by: INTERNAL MEDICINE

## 2021-08-13 RX ORDER — NEBIVOLOL 10 MG/1
10 TABLET ORAL DAILY
Qty: 90 TABLET | Refills: 3 | Status: SHIPPED | OUTPATIENT
Start: 2021-08-13

## 2021-08-13 RX ORDER — CHLORTHALIDONE 25 MG/1
25 TABLET ORAL DAILY
Qty: 90 TABLET | Refills: 3 | Status: SHIPPED | OUTPATIENT
Start: 2021-08-13

## 2021-08-13 RX ORDER — TELMISARTAN 80 MG/1
80 TABLET ORAL NIGHTLY
Qty: 90 TABLET | Refills: 1 | Status: SHIPPED | OUTPATIENT
Start: 2021-08-13

## 2021-08-13 NOTE — ASSESSMENT & PLAN NOTE
Chronic. Improving but still having quite a few pressures in the 130s over 80s at home.   We'll continue nebivolol, continue chlorthalidone however we will switch losartan to telmisartan as telmisartan is more potent and has a longer half-life therefore sh

## 2021-08-13 NOTE — PROGRESS NOTES
History of Present Illness   Patient ID: Betsy Carmona is a 62year old male. Today's Date: 08/13/21  Chief Complaint: No chief complaint on file. Hypertension  This is a chronic problem. The current episode started more than 1 year ago.  The pr Conjunctiva/sclera: Conjunctivae normal.   Pulmonary:      Effort: Pulmonary effort is normal.   Musculoskeletal:         General: Normal range of motion. Cervical back: Normal range of motion and neck supple.    Skin:     Coloration: Skin is not jaund having quite a few pressures in the 130s over 80s at home.   We'll continue nebivolol, continue chlorthalidone however we will switch losartan to telmisartan as telmisartan is more potent and has a longer half-life therefore should provide longer blood pres exist.    ----------------------------------------- PATIENT INSTRUCTIONS-----------------------------------------     There are no Patient Instructions on file for this visit.

## 2022-01-17 ENCOUNTER — OFFICE VISIT (OUTPATIENT)
Dept: INTERNAL MEDICINE CLINIC | Facility: CLINIC | Age: 62
End: 2022-01-17
Payer: COMMERCIAL

## 2022-01-17 VITALS
DIASTOLIC BLOOD PRESSURE: 72 MMHG | HEART RATE: 71 BPM | SYSTOLIC BLOOD PRESSURE: 150 MMHG | HEIGHT: 70 IN | WEIGHT: 281 LBS | BODY MASS INDEX: 40.23 KG/M2

## 2022-01-17 DIAGNOSIS — L02.213 CUTANEOUS ABSCESS OF CHEST WALL: Primary | ICD-10-CM

## 2022-01-17 DIAGNOSIS — Z23 IMMUNIZATION DUE: ICD-10-CM

## 2022-01-17 PROCEDURE — 3078F DIAST BP <80 MM HG: CPT | Performed by: INTERNAL MEDICINE

## 2022-01-17 PROCEDURE — 90686 IIV4 VACC NO PRSV 0.5 ML IM: CPT | Performed by: INTERNAL MEDICINE

## 2022-01-17 PROCEDURE — 3008F BODY MASS INDEX DOCD: CPT | Performed by: INTERNAL MEDICINE

## 2022-01-17 PROCEDURE — 90471 IMMUNIZATION ADMIN: CPT | Performed by: INTERNAL MEDICINE

## 2022-01-17 PROCEDURE — 3077F SYST BP >= 140 MM HG: CPT | Performed by: INTERNAL MEDICINE

## 2022-01-17 PROCEDURE — 90750 HZV VACC RECOMBINANT IM: CPT | Performed by: INTERNAL MEDICINE

## 2022-01-17 PROCEDURE — 90472 IMMUNIZATION ADMIN EACH ADD: CPT | Performed by: INTERNAL MEDICINE

## 2022-01-17 PROCEDURE — 99213 OFFICE O/P EST LOW 20 MIN: CPT | Performed by: INTERNAL MEDICINE

## 2022-01-17 NOTE — PROGRESS NOTES
History of Present Illness   Patient ID: Xiomy Hopson is a 62year old male.   Today's Date: 01/17/22  Chief Complaint: Cyst (bump that burst on chest)      HPI  Plan  Pleasant 29-year-old gentleman who states about 3 to 4 weeks ago he noticed a bump Reviewed Current Medications:  Current Outpatient Medications   Medication Sig Dispense Refill   • nebivolol (BYSTOLIC) 10 MG Oral Tab Take 1 tablet (10 mg total) by mouth daily. FOR BLOOD PRESSURE.  90 tablet 3   • chlorthalidone 25 MG Oral Tab Take 1 35.0-35.9 with comorbidity) (HCC)      Obstructive sleep apnea            CPAP      Hypertension goal BP (blood pressure) < 130/80      Allergic rhinitis      GERD (gastroesophageal reflux disease)       Reviewed Social History:  Social History    Tobacco

## 2022-03-18 DIAGNOSIS — I10 HYPERTENSION GOAL BP (BLOOD PRESSURE) < 130/80: ICD-10-CM

## 2022-03-18 RX ORDER — TELMISARTAN 80 MG/1
80 TABLET ORAL NIGHTLY
Qty: 90 TABLET | Refills: 1 | Status: SHIPPED | OUTPATIENT
Start: 2022-03-18 | End: 2022-07-18

## 2022-03-19 NOTE — TELEPHONE ENCOUNTER
Refill passed per "[facility]" protocol.      Requested Prescriptions   Pending Prescriptions Disp Refills    telmisartan 80 MG Oral Tab [Pharmacy Med Name: TELMISARTAN 80MG TABLETS] 90 tablet 1        Hypertensive Medications Protocol Passed - 3/18/2022  7:54 PM        Passed - CMP or BMP in past 12 months        Passed - Appointment in past 6 or next 3 months        Passed - GFR Non- > 50     Lab Results   Component Value Date    GFRNAA 95 07/16/2021                        Recent Outpatient Visits              2 months ago Cutaneous abscess of chest wall    "[facility]", Nathan Mackey MD    Office Visit    7 months ago Hypertension goal BP (blood pressure) < 130/80    "[employer] [employer]", [address], Claudette Rome, MD    Office Visit    8 months ago Annual physical exam    [address], Claudette Rome, MD    Office Visit    1 year ago Pansinusitis, unspecified chronicity    [address], [address] [place] [address]    Telemedicine    1 year ago Hypertension goal BP (blood pressure) < 130/80    "[facility]", [address], Claudette Rome, MD    Telemedicine             Future Appointments         Provider Department Appt Notes    In 4 months Deedee Long MD "[facility]"Yojana Addison physical

## 2022-04-18 ENCOUNTER — TELEMEDICINE (OUTPATIENT)
Dept: INTERNAL MEDICINE CLINIC | Facility: CLINIC | Age: 62
End: 2022-04-18

## 2022-04-18 DIAGNOSIS — J01.41 ACUTE RECURRENT PANSINUSITIS: Primary | ICD-10-CM

## 2022-04-18 PROCEDURE — 99213 OFFICE O/P EST LOW 20 MIN: CPT | Performed by: INTERNAL MEDICINE

## 2022-04-18 RX ORDER — AMOXICILLIN AND CLAVULANATE POTASSIUM 875; 125 MG/1; MG/1
1 TABLET, FILM COATED ORAL 2 TIMES DAILY
Qty: 20 TABLET | Refills: 0 | Status: SHIPPED | OUTPATIENT
Start: 2022-04-18 | End: 2022-04-28

## 2022-04-18 RX ORDER — METHYLPREDNISOLONE 4 MG/1
TABLET ORAL
Qty: 1 EACH | Refills: 0 | Status: SHIPPED | OUTPATIENT
Start: 2022-04-18

## 2022-05-18 NOTE — PATIENT INSTRUCTIONS
1. Please purchase a blood pressure monitor, if you don't already own one, and record your blood pressure and heart rate 1-2 times daily on the provided log.  The more values you provide at the end of the month the easier it will be to adjust your medic hard against artery walls. This causes damage to the artery walls and then the formation of scar tissue as it heals. This makes the arteries stiff and weak. Plaque sticks to the scarred tissue narrowing and hardening the arteries.  High blood pressure also artery walls between heartbeats (diastolic). Talk with your healthcare provider to find out what your blood pressure goals should be. Controlling blood pressure  If your blood pressure is too high, work with your doctor on a plan for lowering it.  Below a pressure  · Feeling OK does not mean that blood pressure is under control. Likewise, feeling bad doesn’t mean it’s out of control. The only way to know for sure is to check your pressure regularly.   · Medicine is only one part of controlling high blood pre diastolic of less than 80 (120/80)  · Elevated blood pressure is systolic of 250 to 440 and diastolic less than 80  · Stage 1 high blood pressure is systolic is 514 to 781 or diastolic between 80 to 89  · Stage 2 high blood pressure is when systolic is 354 beverages. · Stop smoking. If you are a long-time smoker, this can be hard. Enroll in a stop-smoking program to make it more likely that you will succeed. Talk with your provider about ways to quit. · Learn how to handle stress better.  This is an importa simply take the monitor with you to your next appointment. · Call your provider if you have several high readings. Don't be frightened by a single high reading, but if you get several high readings, check in with your healthcare provider.   · Note: When bl relaxes between beats.   Blood pressure is categorized as normal, elevated, or stage 1 or stage 2 high blood pressure:  · Normal blood pressure is systolic of less than 720 and diastolic of less than 80 (120/80)  · Elevated blood pressure is systolic of 856 Be active at a moderate to vigorous level of physical activity for at least 40 minutes for a minimum of 3 to 4 days a week. Manage stress  · Make time to relax and enjoy life. Find time to laugh.   · Communicate your concerns with your loved ones and yo with no added salt  Stay away from:  · Lunch or deli meat that is cured or smoked  · Cheese  · Tomato juice and ketchup  · Canned vegetables, soups, and fish not labeled as no-salt-added or reduced sodium  · Packaged gravies and sauces  · Olives, pickles, [Constipation] : constipation [Negative] : Heme/Lymph [As Noted in HPI] : as noted in HPI

## 2022-07-18 ENCOUNTER — OFFICE VISIT (OUTPATIENT)
Dept: INTERNAL MEDICINE CLINIC | Facility: CLINIC | Age: 62
End: 2022-07-18
Payer: COMMERCIAL

## 2022-07-18 ENCOUNTER — LAB ENCOUNTER (OUTPATIENT)
Dept: LAB | Age: 62
End: 2022-07-18
Attending: INTERNAL MEDICINE
Payer: COMMERCIAL

## 2022-07-18 VITALS
WEIGHT: 279 LBS | SYSTOLIC BLOOD PRESSURE: 126 MMHG | HEIGHT: 70 IN | BODY MASS INDEX: 39.94 KG/M2 | HEART RATE: 70 BPM | DIASTOLIC BLOOD PRESSURE: 69 MMHG

## 2022-07-18 DIAGNOSIS — Z12.11 COLON CANCER SCREENING: ICD-10-CM

## 2022-07-18 DIAGNOSIS — G47.33 OSA ON CPAP: ICD-10-CM

## 2022-07-18 DIAGNOSIS — I10 HYPERTENSION GOAL BP (BLOOD PRESSURE) < 130/80: ICD-10-CM

## 2022-07-18 DIAGNOSIS — Z12.5 ENCOUNTER FOR SCREENING FOR MALIGNANT NEOPLASM OF PROSTATE: ICD-10-CM

## 2022-07-18 DIAGNOSIS — K21.9 GASTROESOPHAGEAL REFLUX DISEASE WITHOUT ESOPHAGITIS: ICD-10-CM

## 2022-07-18 DIAGNOSIS — Z00.00 ANNUAL PHYSICAL EXAM: ICD-10-CM

## 2022-07-18 DIAGNOSIS — Z00.00 ANNUAL PHYSICAL EXAM: Primary | ICD-10-CM

## 2022-07-18 DIAGNOSIS — E55.9 VITAMIN D DEFICIENCY: ICD-10-CM

## 2022-07-18 DIAGNOSIS — E78.2 MIXED HYPERLIPIDEMIA: ICD-10-CM

## 2022-07-18 DIAGNOSIS — Z99.89 OSA ON CPAP: ICD-10-CM

## 2022-07-18 LAB
ALBUMIN SERPL-MCNC: 3.4 G/DL (ref 3.4–5)
ALBUMIN/GLOB SERPL: 0.9 {RATIO} (ref 1–2)
ALP LIVER SERPL-CCNC: 41 U/L
ALT SERPL-CCNC: 65 U/L
ANION GAP SERPL CALC-SCNC: 8 MMOL/L (ref 0–18)
AST SERPL-CCNC: 30 U/L (ref 15–37)
BILIRUB SERPL-MCNC: 0.5 MG/DL (ref 0.1–2)
BUN BLD-MCNC: 17 MG/DL (ref 7–18)
BUN/CREAT SERPL: 17.3 (ref 10–20)
CALCIUM BLD-MCNC: 9.3 MG/DL (ref 8.5–10.1)
CHLORIDE SERPL-SCNC: 101 MMOL/L (ref 98–112)
CHOLEST SERPL-MCNC: 122 MG/DL (ref ?–200)
CO2 SERPL-SCNC: 30 MMOL/L (ref 21–32)
COMPLEXED PSA SERPL-MCNC: 2.25 NG/ML (ref ?–4)
CREAT BLD-MCNC: 0.98 MG/DL
DEPRECATED RDW RBC AUTO: 42.8 FL (ref 35.1–46.3)
ERYTHROCYTE [DISTWIDTH] IN BLOOD BY AUTOMATED COUNT: 13 % (ref 11–15)
EST. AVERAGE GLUCOSE BLD GHB EST-MCNC: 143 MG/DL (ref 68–126)
FASTING PATIENT LIPID ANSWER: NO
FASTING STATUS PATIENT QL REPORTED: NO
GLOBULIN PLAS-MCNC: 3.6 G/DL (ref 2.8–4.4)
GLUCOSE BLD-MCNC: 134 MG/DL (ref 70–99)
HBA1C MFR BLD: 6.6 % (ref ?–5.7)
HCT VFR BLD AUTO: 40.2 %
HDLC SERPL-MCNC: 32 MG/DL (ref 40–59)
HGB BLD-MCNC: 13.7 G/DL
LDLC SERPL CALC-MCNC: 42 MG/DL (ref ?–100)
MCH RBC QN AUTO: 31.1 PG (ref 26–34)
MCHC RBC AUTO-ENTMCNC: 34.1 G/DL (ref 31–37)
MCV RBC AUTO: 91.4 FL
NONHDLC SERPL-MCNC: 90 MG/DL (ref ?–130)
OSMOLALITY SERPL CALC.SUM OF ELEC: 292 MOSM/KG (ref 275–295)
PLATELET # BLD AUTO: 239 10(3)UL (ref 150–450)
POTASSIUM SERPL-SCNC: 3.5 MMOL/L (ref 3.5–5.1)
PROT SERPL-MCNC: 7 G/DL (ref 6.4–8.2)
RBC # BLD AUTO: 4.4 X10(6)UL
SODIUM SERPL-SCNC: 139 MMOL/L (ref 136–145)
TRIGL SERPL-MCNC: 318 MG/DL (ref 30–149)
TSI SER-ACNC: 1.38 MIU/ML (ref 0.36–3.74)
VIT D+METAB SERPL-MCNC: 31.8 NG/ML (ref 30–100)
VLDLC SERPL CALC-MCNC: 44 MG/DL (ref 0–30)
WBC # BLD AUTO: 9.7 X10(3) UL (ref 4–11)

## 2022-07-18 PROCEDURE — 80053 COMPREHEN METABOLIC PANEL: CPT

## 2022-07-18 PROCEDURE — 3074F SYST BP LT 130 MM HG: CPT | Performed by: INTERNAL MEDICINE

## 2022-07-18 PROCEDURE — 36415 COLL VENOUS BLD VENIPUNCTURE: CPT

## 2022-07-18 PROCEDURE — 84443 ASSAY THYROID STIM HORMONE: CPT

## 2022-07-18 PROCEDURE — 99396 PREV VISIT EST AGE 40-64: CPT | Performed by: INTERNAL MEDICINE

## 2022-07-18 PROCEDURE — 82306 VITAMIN D 25 HYDROXY: CPT

## 2022-07-18 PROCEDURE — 83036 HEMOGLOBIN GLYCOSYLATED A1C: CPT

## 2022-07-18 PROCEDURE — 85027 COMPLETE CBC AUTOMATED: CPT

## 2022-07-18 PROCEDURE — 80061 LIPID PANEL: CPT

## 2022-07-18 PROCEDURE — 3008F BODY MASS INDEX DOCD: CPT | Performed by: INTERNAL MEDICINE

## 2022-07-18 PROCEDURE — 3078F DIAST BP <80 MM HG: CPT | Performed by: INTERNAL MEDICINE

## 2022-07-18 RX ORDER — CHLORTHALIDONE 25 MG/1
25 TABLET ORAL DAILY
Qty: 90 TABLET | Refills: 3 | Status: SHIPPED | OUTPATIENT
Start: 2022-07-18

## 2022-07-18 RX ORDER — TELMISARTAN 80 MG/1
80 TABLET ORAL NIGHTLY
Qty: 90 TABLET | Refills: 3 | Status: SHIPPED | OUTPATIENT
Start: 2022-07-18

## 2022-07-18 RX ORDER — OMEPRAZOLE 20 MG/1
20 CAPSULE, DELAYED RELEASE ORAL EVERY MORNING
Qty: 90 CAPSULE | Refills: 3 | Status: SHIPPED | OUTPATIENT
Start: 2022-07-18

## 2022-07-18 RX ORDER — ROSUVASTATIN CALCIUM 5 MG/1
5 TABLET, COATED ORAL NIGHTLY
Qty: 90 TABLET | Refills: 3 | Status: SHIPPED | OUTPATIENT
Start: 2022-07-18

## 2022-07-18 RX ORDER — NEBIVOLOL 10 MG/1
10 TABLET ORAL DAILY
Qty: 90 TABLET | Refills: 3 | Status: SHIPPED | OUTPATIENT
Start: 2022-07-18

## 2022-09-16 NOTE — TELEPHONE ENCOUNTER
Refill passed per [facility] protocol.      Requested Prescriptions   Pending Prescriptions Disp Refills    TELMISARTAN 80 MG Oral Tab [Pharmacy Med Name: TELMISARTAN 80MG TABLETS] 90 tablet 1     Sig: TAKE 1 TABLET(80 MG) BY MOUTH EVERY NIGHT FOR BLOOD PRESSURE. STOP LOSARTAN        Hypertensive Medications Protocol Passed - 3/18/2022  7:54 PM        Passed - CMP or BMP in past 12 months        Passed - Appointment in past 6 or next 3 months        Passed - GFR Non- > 50     Lab Results   Component Value Date    GFRNAA 95 07/16/2021                        Recent Outpatient Visits              2 months ago Cutaneous abscess of chest wall    [facility], Nathan Mackey MD    Office Visit    7 months ago Hypertension goal BP (blood pressure) < 130/80    [facility], Katey Mackey MD    Office Visit    8 months ago Annual physical exam    Katey Khan MD    Office Visit    1 year ago Pansinusitis, unspecified chronicity    Kristen Cervantes, [address] [place] [address], [facility]    Telemedicine    1 year ago Hypertension goal BP (blood pressure) < 130/80    [facility], Katey Mackey MD    Telemedicine             Future Appointments         Provider Department Appt Notes    In 4 months [facility]reguin [facility]

## 2022-12-21 ENCOUNTER — E-VISIT (OUTPATIENT)
Dept: TELEHEALTH | Age: 62
End: 2022-12-21
Payer: COMMERCIAL

## 2022-12-21 DIAGNOSIS — J01.90 ACUTE SINUSITIS, RECURRENCE NOT SPECIFIED, UNSPECIFIED LOCATION: ICD-10-CM

## 2022-12-21 RX ORDER — AMOXICILLIN AND CLAVULANATE POTASSIUM 875; 125 MG/1; MG/1
1 TABLET, FILM COATED ORAL 2 TIMES DAILY
Qty: 14 TABLET | Refills: 0 | Status: SHIPPED | OUTPATIENT
Start: 2022-12-21 | End: 2022-12-28

## 2022-12-21 RX ORDER — METHYLPREDNISOLONE 4 MG/1
TABLET ORAL
Qty: 21 EACH | Refills: 0 | Status: SHIPPED | OUTPATIENT
Start: 2022-12-21

## 2023-06-11 ENCOUNTER — TELEPHONE (OUTPATIENT)
Dept: INTERNAL MEDICINE CLINIC | Facility: CLINIC | Age: 63
End: 2023-06-11

## 2023-06-11 DIAGNOSIS — J45.20 INTERMITTENT ASTHMA WITHOUT COMPLICATION, UNSPECIFIED ASTHMA SEVERITY: ICD-10-CM

## 2023-06-12 RX ORDER — ALBUTEROL SULFATE 90 UG/1
2 AEROSOL, METERED RESPIRATORY (INHALATION) EVERY 4 HOURS PRN
Qty: 25.5 G | Refills: 0 | Status: SHIPPED | OUTPATIENT
Start: 2023-06-12

## 2023-06-12 NOTE — TELEPHONE ENCOUNTER
Please review. Protocol failed / Has no protocol. Routing to  On-Call as Dr. Shante Yuen is out of office. Last rx  2022, and was marked discontinued at Last Office Visit 22 with Dr. Perez Arms on: 21   Associated Dx: Intermittent asthma without complication, unspecified asthma severity   Discontinued on: 22   Authorizing provider: Alejandro Oviedo MD     [employer] message sent to patient to schedule an office visit with PCP. Will also make a phone attempt. Requested Prescriptions   Pending Prescriptions Disp Refills    ALBUTEROL 108 (90 Base) MCG/ACT Inhalation Aero Soln [Pharmacy Med Name: ALBUTEROL HFA INH (200 PUFFS) 8.5GM] 25.5 g 0     Sig: INHALE 2 PUFFS INTO THE LUNGS EVERY 4 HOURS AS NEEDED FOR WHEEZING SHORTNESS OF BREATH.        Asthma & COPD Medication Protocol Failed - 2023 12:15 PM        Failed - In person appointment or virtual visit in the past 6 mos or appointment in next 3 mos     Recent Outpatient Visits              5 months ago Acute sinusitis, recurrence not specified, unspecified location    [facility] Alvin Closs, PA-C    E-Visit    10 months ago Annual physical exam    [facility], Belkys Crain MD    Office Visit    1 year ago Acute recurrent pansinusitis    [facility], Belkys Crain MD    Telemedicine    1 year ago Cutaneous abscess of chest wall    [facility]    Office Visit    1 year ago Hypertension goal BP (blood pressure) < 130/80    [address], [address], Belkys Crain MD    Office Visit                            Recent Outpatient Visits              5 months ago Acute sinusitis, recurrence not specified, unspecified [facility] Alvin Closs, PA-C    E-Visit    10 months ago Annual physical exam    Mildred Snowden MD    Office Visit    1 year ago Acute recurrent pansinusitis    Mildred Snowden MD    Telemedicine    1 year ago Cutaneous abscess of chest wall    Mildred Snowden MD    Office Visit    1 year ago Hypertension goal BP (blood pressure) < 130/80    Mildred Snowden MD    Office Visit

## 2023-07-21 ENCOUNTER — OFFICE VISIT (OUTPATIENT)
Dept: INTERNAL MEDICINE CLINIC | Facility: CLINIC | Age: 63
End: 2023-07-21

## 2023-07-21 ENCOUNTER — LAB ENCOUNTER (OUTPATIENT)
Dept: LAB | Age: 63
End: 2023-07-21
Attending: INTERNAL MEDICINE
Payer: COMMERCIAL

## 2023-07-21 VITALS
WEIGHT: 250 LBS | DIASTOLIC BLOOD PRESSURE: 69 MMHG | SYSTOLIC BLOOD PRESSURE: 120 MMHG | BODY MASS INDEX: 35.79 KG/M2 | HEART RATE: 61 BPM | HEIGHT: 70 IN

## 2023-07-21 DIAGNOSIS — Z23 IMMUNIZATION DUE: ICD-10-CM

## 2023-07-21 DIAGNOSIS — J45.40 MODERATE PERSISTENT ASTHMA WITHOUT COMPLICATION: ICD-10-CM

## 2023-07-21 DIAGNOSIS — I10 HYPERTENSION GOAL BP (BLOOD PRESSURE) < 130/80: ICD-10-CM

## 2023-07-21 DIAGNOSIS — Z12.11 COLON CANCER SCREENING: ICD-10-CM

## 2023-07-21 DIAGNOSIS — E78.2 MIXED HYPERLIPIDEMIA: ICD-10-CM

## 2023-07-21 DIAGNOSIS — Z00.00 ANNUAL PHYSICAL EXAM: Primary | ICD-10-CM

## 2023-07-21 DIAGNOSIS — E55.9 VITAMIN D DEFICIENCY: ICD-10-CM

## 2023-07-21 DIAGNOSIS — Z12.5 ENCOUNTER FOR SCREENING FOR MALIGNANT NEOPLASM OF PROSTATE: ICD-10-CM

## 2023-07-21 LAB
ALBUMIN SERPL-MCNC: 3.6 G/DL (ref 3.4–5)
ALBUMIN/GLOB SERPL: 1 {RATIO} (ref 1–2)
ALP LIVER SERPL-CCNC: 42 U/L
ALT SERPL-CCNC: 33 U/L
ANION GAP SERPL CALC-SCNC: 3 MMOL/L (ref 0–18)
AST SERPL-CCNC: 15 U/L (ref 15–37)
BILIRUB SERPL-MCNC: 0.6 MG/DL (ref 0.1–2)
BUN BLD-MCNC: 23 MG/DL (ref 7–18)
CALCIUM BLD-MCNC: 9.2 MG/DL (ref 8.5–10.1)
CHLORIDE SERPL-SCNC: 100 MMOL/L (ref 98–112)
CHOLEST SERPL-MCNC: 107 MG/DL (ref ?–200)
CO2 SERPL-SCNC: 32 MMOL/L (ref 21–32)
COMPLEXED PSA SERPL-MCNC: 2.43 NG/ML (ref ?–4)
CREAT BLD-MCNC: 1.05 MG/DL
EGFRCR SERPLBLD CKD-EPI 2021: 80 ML/MIN/1.73M2 (ref 60–?)
ERYTHROCYTE [DISTWIDTH] IN BLOOD BY AUTOMATED COUNT: 13 %
EST. AVERAGE GLUCOSE BLD GHB EST-MCNC: 128 MG/DL (ref 68–126)
FASTING PATIENT LIPID ANSWER: YES
FASTING STATUS PATIENT QL REPORTED: YES
GLOBULIN PLAS-MCNC: 3.5 G/DL (ref 2.8–4.4)
GLUCOSE BLD-MCNC: 100 MG/DL (ref 70–99)
HBA1C MFR BLD: 6.1 % (ref ?–5.7)
HCT VFR BLD AUTO: 40.1 %
HDLC SERPL-MCNC: 46 MG/DL (ref 40–59)
HGB BLD-MCNC: 13.3 G/DL
LDLC SERPL CALC-MCNC: 40 MG/DL (ref ?–100)
MCH RBC QN AUTO: 30.4 PG (ref 26–34)
MCHC RBC AUTO-ENTMCNC: 33.2 G/DL (ref 31–37)
MCV RBC AUTO: 91.6 FL
NONHDLC SERPL-MCNC: 61 MG/DL (ref ?–130)
OSMOLALITY SERPL CALC.SUM OF ELEC: 284 MOSM/KG (ref 275–295)
PLATELET # BLD AUTO: 247 10(3)UL (ref 150–450)
POTASSIUM SERPL-SCNC: 3.8 MMOL/L (ref 3.5–5.1)
PROT SERPL-MCNC: 7.1 G/DL (ref 6.4–8.2)
RBC # BLD AUTO: 4.38 X10(6)UL
SODIUM SERPL-SCNC: 135 MMOL/L (ref 136–145)
TRIGL SERPL-MCNC: 116 MG/DL (ref 30–149)
TSI SER-ACNC: 1.49 MIU/ML (ref 0.36–3.74)
VIT D+METAB SERPL-MCNC: 42.6 NG/ML (ref 30–100)
VLDLC SERPL CALC-MCNC: 16 MG/DL (ref 0–30)
WBC # BLD AUTO: 11.3 X10(3) UL (ref 4–11)

## 2023-07-21 PROCEDURE — 83036 HEMOGLOBIN GLYCOSYLATED A1C: CPT | Performed by: INTERNAL MEDICINE

## 2023-07-21 PROCEDURE — 3078F DIAST BP <80 MM HG: CPT | Performed by: INTERNAL MEDICINE

## 2023-07-21 PROCEDURE — 85027 COMPLETE CBC AUTOMATED: CPT | Performed by: INTERNAL MEDICINE

## 2023-07-21 PROCEDURE — 80061 LIPID PANEL: CPT | Performed by: INTERNAL MEDICINE

## 2023-07-21 PROCEDURE — 82306 VITAMIN D 25 HYDROXY: CPT | Performed by: INTERNAL MEDICINE

## 2023-07-21 PROCEDURE — 84443 ASSAY THYROID STIM HORMONE: CPT | Performed by: INTERNAL MEDICINE

## 2023-07-21 PROCEDURE — 36415 COLL VENOUS BLD VENIPUNCTURE: CPT | Performed by: INTERNAL MEDICINE

## 2023-07-21 PROCEDURE — 90471 IMMUNIZATION ADMIN: CPT | Performed by: INTERNAL MEDICINE

## 2023-07-21 PROCEDURE — 3074F SYST BP LT 130 MM HG: CPT | Performed by: INTERNAL MEDICINE

## 2023-07-21 PROCEDURE — 80053 COMPREHEN METABOLIC PANEL: CPT | Performed by: INTERNAL MEDICINE

## 2023-07-21 PROCEDURE — 99396 PREV VISIT EST AGE 40-64: CPT | Performed by: INTERNAL MEDICINE

## 2023-07-21 PROCEDURE — 3008F BODY MASS INDEX DOCD: CPT | Performed by: INTERNAL MEDICINE

## 2023-07-21 PROCEDURE — 90715 TDAP VACCINE 7 YRS/> IM: CPT | Performed by: INTERNAL MEDICINE

## 2023-07-21 RX ORDER — TELMISARTAN 80 MG/1
80 TABLET ORAL NIGHTLY
Qty: 90 TABLET | Refills: 9 | Status: SHIPPED | OUTPATIENT
Start: 2023-07-21

## 2023-07-21 RX ORDER — ROSUVASTATIN CALCIUM 5 MG/1
5 TABLET, COATED ORAL NIGHTLY
Qty: 90 TABLET | Refills: 9 | Status: SHIPPED | OUTPATIENT
Start: 2023-07-21

## 2023-07-21 RX ORDER — CHLORTHALIDONE 25 MG/1
25 TABLET ORAL DAILY
Qty: 90 TABLET | Refills: 9 | Status: SHIPPED | OUTPATIENT
Start: 2023-07-21

## 2023-07-21 RX ORDER — NEBIVOLOL 10 MG/1
10 TABLET ORAL DAILY
Qty: 90 TABLET | Refills: 9 | Status: SHIPPED | OUTPATIENT
Start: 2023-07-21

## 2023-07-21 RX ORDER — FLUTICASONE PROPIONATE AND SALMETEROL 250; 50 UG/1; UG/1
1 POWDER RESPIRATORY (INHALATION) EVERY 12 HOURS SCHEDULED
Qty: 3 EACH | Refills: 9 | Status: SHIPPED | OUTPATIENT
Start: 2023-07-21

## 2023-07-21 NOTE — PATIENT INSTRUCTIONS
Albuterol was used to open the lungs but it does not provide anti-inflammatory effect therefore it does not solve asthma it is simply providing a Band-Aid to your current symptoms. Many people have this and they are perfectly fine using albuterol but many people now are starting to develop your asthma. In [facility] we have been using smart therapy which is single maintenance and rescue therapy which is essentially using a combination inhaler like Advair or Symbicort as your daily inhaler and your rescue inhaler and then using albuterol on top of that. The goal here is for the next few weeks use the albuterol as needed but use the Advair as directed and once you start to feel well you may then switch to using the Advair as needed. You would then use this instead of the albuterol and you may notice less need overall.

## 2023-09-05 ENCOUNTER — HOSPITAL ENCOUNTER (OUTPATIENT)
Age: 63
Discharge: HOME | End: 2023-09-05
Payer: COMMERCIAL

## 2023-09-05 VITALS
OXYGEN SATURATION: 97 % | TEMPERATURE: 98 F | DIASTOLIC BLOOD PRESSURE: 68 MMHG | HEART RATE: 67 BPM | RESPIRATION RATE: 20 BRPM | SYSTOLIC BLOOD PRESSURE: 146 MMHG

## 2023-09-05 DIAGNOSIS — S30.821A BLISTER (NONTHERMAL) OF ABDOMINAL WALL, INITIAL ENCOUNTER: Primary | ICD-10-CM

## 2023-09-05 PROCEDURE — 99213 OFFICE O/P EST LOW 20 MIN: CPT

## 2023-09-05 PROCEDURE — 99203 OFFICE O/P NEW LOW 30 MIN: CPT

## 2023-09-05 PROCEDURE — 10140 I&D HMTMA SEROMA/FLUID COLLJ: CPT

## 2023-09-05 NOTE — DISCHARGE INSTRUCTIONS
Continue to wash your skin like normal recommend applying over-the-counter bacitracin or Polysporin over the area and covering up with a Band-Aid for 2 more days then discontinue gently wash the area dry it well if you are at home leave it open to air if you go out covered up with a Band-Aid follow-up with your primary care provider 2 to 3 days for a wound check if you develop redness around the blister pus or drainage from the skin fevers or chills or pain return to [facility] or go to the nearest emergency department.

## 2023-09-07 ENCOUNTER — TELEPHONE (OUTPATIENT)
Dept: INTERNAL MEDICINE CLINIC | Facility: CLINIC | Age: 63
End: 2023-09-07

## 2023-09-07 NOTE — TELEPHONE ENCOUNTER
Patient asking if [facility] can \"squeeze\" him in tomorrow. Patient declining appointments with any other provider. Patient was seen in immediate care after lifting heavy cabinets. He states he had a blister on navel started out the size of a dime,then nickel, then quarter. It was popped in IC and he was told to put antibiotic ointment and bandaid    This area is now become more painful, increased discomfort with bending over ,\" something just not right\"  No fever,nausea, vomiting or spreading redness. Please advise.

## 2023-09-07 NOTE — TELEPHONE ENCOUNTER
Ok to add in, please double book anywhere in the AM have him arrive 8am, will try to see him first thing. Luz Parra

## 2023-09-08 ENCOUNTER — HOSPITAL ENCOUNTER (OUTPATIENT)
Dept: CT IMAGING | Facility: HOSPITAL | Age: 63
Discharge: HOME | End: 2023-09-08
Attending: INTERNAL MEDICINE
Payer: COMMERCIAL

## 2023-09-08 ENCOUNTER — HOSPITAL ENCOUNTER (EMERGENCY)
Facility: HOSPITAL | Age: 63
Discharge: HOME | End: 2023-09-08
Attending: STUDENT IN AN ORGANIZED HEALTH CARE EDUCATION/TRAINING PROGRAM
Payer: COMMERCIAL

## 2023-09-08 ENCOUNTER — OFFICE VISIT (OUTPATIENT)
Dept: INTERNAL MEDICINE CLINIC | Facility: CLINIC | Age: 63
End: 2023-09-08

## 2023-09-08 ENCOUNTER — TELEPHONE (OUTPATIENT)
Dept: INTERNAL MEDICINE CLINIC | Facility: CLINIC | Age: 63
End: 2023-09-08

## 2023-09-08 VITALS
HEART RATE: 61 BPM | WEIGHT: 249.13 LBS | DIASTOLIC BLOOD PRESSURE: 58 MMHG | BODY MASS INDEX: 36 KG/M2 | SYSTOLIC BLOOD PRESSURE: 116 MMHG | RESPIRATION RATE: 16 BRPM | TEMPERATURE: 98 F | OXYGEN SATURATION: 97 %

## 2023-09-08 DIAGNOSIS — R10.33 UMBILICAL PAIN: ICD-10-CM

## 2023-09-08 DIAGNOSIS — K42.0 UMBILICAL HERNIA WITH OBSTRUCTION, WITHOUT GANGRENE: Primary | ICD-10-CM

## 2023-09-08 DIAGNOSIS — K42.9 UMBILICAL HERNIA WITHOUT OBSTRUCTION AND WITHOUT GANGRENE: Primary | ICD-10-CM

## 2023-09-08 DIAGNOSIS — K42.0 UMBILICAL HERNIA WITH OBSTRUCTION, WITHOUT GANGRENE: ICD-10-CM

## 2023-09-08 LAB
CREAT BLD-MCNC: 1 MG/DL
EGFRCR SERPLBLD CKD-EPI 2021: 85 ML/MIN/1.73M2 (ref 60–?)

## 2023-09-08 PROCEDURE — 99214 OFFICE O/P EST MOD 30 MIN: CPT | Performed by: INTERNAL MEDICINE

## 2023-09-08 PROCEDURE — 82565 ASSAY OF CREATININE: CPT

## 2023-09-08 PROCEDURE — 74177 CT ABD & PELVIS W/CONTRAST: CPT | Performed by: INTERNAL MEDICINE

## 2023-09-08 PROCEDURE — 99283 EMERGENCY DEPT VISIT LOW MDM: CPT

## 2023-09-08 PROCEDURE — 99282 EMERGENCY DEPT VISIT SF MDM: CPT

## 2023-09-08 NOTE — TELEPHONE ENCOUNTER
RN Triage    Is this meant for Dr. Estrella Durbin with Surgery? Dr. Rae Horton is with GI-he does scopes but not surgery for hernias    Please let us know if anything needed from GI service.     Thank you

## 2023-09-08 NOTE — TELEPHONE ENCOUNTER
Dr. Kae Herrera - patient going to ER in about 2-3 hours. See Dr. Vasquez Mayes Notes below, office visit today, CT Results, and 9/5/23 Immediate Care visit      RN called General Surgery , Dr. Anand Coleman office. Spoke with Kana Doe RN . Patient's date of birth and full name both confirmed. RN informed of provider's message as detailed below. She says Dr. Kae Herrera not in office right now. No surgeon in office right now. Dr. Kae Herrera is on-call today though  And considering the situation, patient should go to Emergency Room for evaluation. And Dr. Mere Rolle would be consulted, because he is on-call. RN communicated with Dr. Suzi Dakins. Per DR. Geiger: \"Agree with that plan. Please let patient know I am in agreement with this plan. Please let ER know to contact dr Kayley Mccullough for consult\"       RN called patient. Patient's date of birth and full name both confirmed. RN informed of provider's message as detailed. He verbalizes understanding of all information, and agreeable to plan. But is currently in [facility], and will probably not be able to get to [place] [address] ER for at least 2 hours. But will start going to [place] [address] ER. RN called [place] [facility], spoke with Triage RN ER, Zhou Martinez RN  Informed her of the situation and she will add note to patient's chart, that he needs to be seen by Dr. Mere Rolle.        Future Appointments   Date Time Provider Marisela Gomez   9/27/2023  3:00 PM Merritt Venegas  [facility]

## 2023-09-08 NOTE — ED INITIAL ASSESSMENT (HPI)
Patient had CT scan this am and was told to come into the ER. Patient was told he has hernia and that Dr Jhony Howe would like to see patient. Denies nausea/vomiting.

## 2023-09-08 NOTE — TELEPHONE ENCOUNTER
Patient called back and wanted to review the recommendations---> reviewed all notes below and he is agreeable in going to to [facility] now, as advised earlier. Patient verbalized understanding. No further questions or concerns at this time.

## 2023-09-08 NOTE — PROGRESS NOTES
OFFICE NOTE     Patient ID: Anel Peguero is a 59year old male. Today's Date: 09/08/23  Chief Complaint: No chief complaint on file. HPI  71-year-old gentleman who presents for abdominal pain. He was seen by APRN the other day who noted that this was a blister and apparently incised and drained this. Patient has no obvious trauma to this area but he was lifting very heavy cabinets prior to onset. He states since the blister was unroofed it has become more red, states he does not feel right and there is discomfort and pain in the umbilical region. He does not have any other GI symptoms but there is tenderness that is worsening. ABD HERNIA      There were no vitals filed for this visit. body mass index is unknown because there is no height or weight on file. BP Readings from Last 3 Encounters:  09/05/23 : 146/68  07/21/23 : 120/69  07/18/22 : 126/69    The ASCVD Risk score (Joseph BRYANT, et al., 2019) failed to calculate for the following reasons: The valid total cholesterol range is 130 to 320 mg/dL      Medications reviewed:  Current Outpatient Medications   Medication Sig Dispense Refill    chlorthalidone 25 MG Oral Tab Take 1 tablet (25 mg total) by mouth daily. FOR BLOOD PRESSURE. 90 tablet 9    nebivolol (BYSTOLIC) 10 MG Oral Tab Take 1 tablet (10 mg total) by mouth daily. FOR BLOOD PRESSURE. 90 tablet 9    rosuvastatin 5 MG Oral Tab Take 1 tablet (5 mg total) by mouth nightly. FOR CHOLESTEROL. 90 tablet 9    telmisartan 80 MG Oral Tab Take 1 tablet (80 mg total) by mouth at bedtime. FOR BLOOD PRESSURE. 90 tablet 9    fluticasone-salmeterol (ADVAIR DISKUS) 250-50 MCG/ACT Inhalation Aerosol Powder, Breath Activated Inhale 1 puff into the lungs every 12 (twelve) hours.  3 each 9    albuterol 108 (90 Base) MCG/ACT Inhalation Aero Soln Inhale 2 puffs into the lungs every 4 (four) hours as needed for Wheezing or Shortness of Breath. 25.5 g 0    omeprazole 20 MG Oral Capsule Delayed Release Take 1 capsule (20 mg total) by mouth every morning. FOR ACID REFLUX. 90 capsule 3         Assessment & Plan    1. Umbilical hernia with obstruction, without gangrene (Primary)  -     CT ABDOMEN+PELVIS(CPT=74176); Future; Expected date: 09/08/2023  2. Umbilical pain  -     CT ABDOMEN+PELVIS(CPT=74176); Future; Expected date: 09/08/2023  Plan  Given his lifting along with location high suspicion this is an umbilical hernia and given his pain there is concerns for strangulation, I ordered stat CT abdomen pelvis to rule out any car serrated hernia versus abscess versus nonpathologic findings. Patient advised n.p.o. for now until results arrive in case this is incarcerated for emergency surgery. Patient is advised between now and results if he has any worsening pain to go to the ER for evaluation. Addendum: has hernia, see surgery. MESENTERY: Small umbilical hernia containing mesenteric fat. No bowel herniation. Mild amount of inflammatory fat stranding is seen within the left para-midline mesentery with a few subcentimeter likely reactive mesenteric lymph nodes. Follow Up: As needed/if symptoms worsen. Objective/ Results:   Physical Exam  Vitals and nursing note reviewed. Constitutional:       General: He is not in acute distress. Appearance: Normal appearance. HENT:      Head: Normocephalic. Right Ear: External ear normal.      Left Ear: External ear normal.   Eyes:      Extraocular Movements: Extraocular movements intact. Conjunctiva/sclera: Conjunctivae normal.   Pulmonary:      Effort: Pulmonary effort is normal.   Abdominal:      Tenderness: There is abdominal tenderness. Hernia: A hernia is present. Musculoskeletal:         General: Normal range of motion. Cervical back: Normal range of motion and neck supple. Skin:     Coloration: Skin is not jaundiced. Neurological:      General: No focal deficit present.       Mental Status: He is alert and oriented to person, place, and time. Mental status is at baseline. Psychiatric:         Mood and Affect: Mood normal.         Behavior: Behavior normal.        Reviewed:    Patient Active Problem List    Mixed hyperlipidemia      Severe obesity (BMI 35.0-35.9 with comorbidity)       FAY on CPAP            CPAP      Hypertension goal BP (blood pressure) < 130/80      Allergic rhinitis      GERD (gastroesophageal reflux disease)       No Known Allergies     Social History     Socioeconomic History    Marital status:    Tobacco Use    Smoking status: Never     Passive exposure: Never    Smokeless tobacco: Never   Vaping Use    Vaping Use: Never used   Substance and Sexual Activity    Alcohol use: Yes     Comment: Socially 1 Beer;     Drug use: No   Other Topics Concern    Caffeine Concern Yes     Comment: Coffee, Soda 3 cups daily; Review of Systems  All other systems negative unless otherwise stated in ROS or HPI above. Jl Gomez MD  Internal Medicine       Call office with any questions or seek emergency care if necessary. Patient understands and agrees to follow directions and advice. ----------------------------------------- PATIENT INSTRUCTIONS-----------------------------------------     There are no Patient Instructions on file for this visit.

## 2023-09-08 NOTE — TELEPHONE ENCOUNTER
Edwin Moreno MD  P Em Rn Triage; Jose Herzog MD  Hi Dr Denis Feliz, was wondering if there was any chance to squeeze Leopold Severin in? JENNIFER has performed an incision on his umbilicus for a \"blister\" however this is a hernia with fat incaceration; he is having intermittent pain but no GI symptoms. I suspect he will need some sort of intervention but he is leaving next week for a trip so I've advised him to hold off until discussion with surgery given his symptoms. [phone] if you need anything. If you are full and are ok with him seeing someone else please feel free to let patient know. I will forward to our triage to have them reach out to your service and I've asked patient to call office now for visit. Thanks! -Mauricio    MESENTERY: Small umbilical hernia containing mesenteric fat. No bowel herniation. Mild amount of inflammatory fat stranding is seen within the left para-midline mesentery with a few subcentimeter likely reactive mesenteric lymph nodes. Staff: please assist in getting patient in to surgery asap, see above result.  Link me with any issues,thx

## 2023-09-11 ENCOUNTER — OFFICE VISIT (OUTPATIENT)
Dept: SURGERY | Facility: CLINIC | Age: 63
End: 2023-09-11

## 2023-09-11 VITALS — WEIGHT: 252 LBS | HEIGHT: 70 IN | BODY MASS INDEX: 36.08 KG/M2

## 2023-09-11 DIAGNOSIS — K42.9 UMBILICAL HERNIA WITHOUT OBSTRUCTION AND WITHOUT GANGRENE: Primary | ICD-10-CM

## 2023-09-11 PROCEDURE — 3008F BODY MASS INDEX DOCD: CPT | Performed by: SURGERY

## 2023-09-11 PROCEDURE — 99204 OFFICE O/P NEW MOD 45 MIN: CPT | Performed by: SURGERY

## 2023-09-18 ENCOUNTER — TELEPHONE (OUTPATIENT)
Dept: SURGERY | Facility: CLINIC | Age: 63
End: 2023-09-18

## 2023-09-18 DIAGNOSIS — K42.9 UMBILICAL HERNIA WITHOUT OBSTRUCTION AND WITHOUT GANGRENE: Primary | ICD-10-CM

## 2023-09-20 ENCOUNTER — TELEPHONE (OUTPATIENT)
Dept: SURGERY | Facility: CLINIC | Age: 63
End: 2023-09-20

## 2023-09-20 NOTE — TELEPHONE ENCOUNTER
I called [employer] [phone], spoke to oBaz. Out pt surgery on 10/5/2023 at [facility]. ICD-10 code: K42.9   CPT code: 34612    Per Rachel, No PA is require. Call reference # is [id number].

## 2023-09-27 ENCOUNTER — EKG ENCOUNTER (OUTPATIENT)
Dept: LAB | Age: 63
End: 2023-09-27
Attending: SURGERY
Payer: COMMERCIAL

## 2023-09-27 DIAGNOSIS — K42.9 UMBILICAL HERNIA WITHOUT OBSTRUCTION AND WITHOUT GANGRENE: ICD-10-CM

## 2023-09-27 PROCEDURE — 93010 ELECTROCARDIOGRAM REPORT: CPT | Performed by: STUDENT IN AN ORGANIZED HEALTH CARE EDUCATION/TRAINING PROGRAM

## 2023-09-27 PROCEDURE — 93005 ELECTROCARDIOGRAM TRACING: CPT

## 2023-09-28 LAB
ATRIAL RATE: 59 BPM
P AXIS: 63 DEGREES
P-R INTERVAL: 192 MS
Q-T INTERVAL: 442 MS
QRS DURATION: 108 MS
QTC CALCULATION (BEZET): 437 MS
R AXIS: 50 DEGREES
T AXIS: 36 DEGREES
VENTRICULAR RATE: 59 BPM

## 2023-10-02 RX ORDER — FEXOFENADINE HCL 180 MG/1
180 TABLET ORAL DAILY
COMMUNITY
End: 2023-10-05

## 2023-10-05 ENCOUNTER — ANESTHESIA (OUTPATIENT)
Dept: SURGERY | Facility: HOSPITAL | Age: 63
End: 2023-10-05
Payer: COMMERCIAL

## 2023-10-05 ENCOUNTER — HOSPITAL ENCOUNTER (OUTPATIENT)
Facility: HOSPITAL | Age: 63
Setting detail: HOSPITAL OUTPATIENT SURGERY
Discharge: HOME | End: 2023-10-05
Attending: SURGERY | Admitting: SURGERY
Payer: COMMERCIAL

## 2023-10-05 ENCOUNTER — ANESTHESIA EVENT (OUTPATIENT)
Dept: SURGERY | Facility: HOSPITAL | Age: 63
End: 2023-10-05
Payer: COMMERCIAL

## 2023-10-05 VITALS
DIASTOLIC BLOOD PRESSURE: 68 MMHG | HEART RATE: 60 BPM | WEIGHT: 246 LBS | SYSTOLIC BLOOD PRESSURE: 128 MMHG | RESPIRATION RATE: 18 BRPM | OXYGEN SATURATION: 100 % | HEIGHT: 70 IN | TEMPERATURE: 98 F | BODY MASS INDEX: 35.22 KG/M2

## 2023-10-05 PROCEDURE — 0WUF4JZ SUPPLEMENT ABDOMINAL WALL WITH SYNTHETIC SUBSTITUTE, PERCUTANEOUS ENDOSCOPIC APPROACH: ICD-10-PCS | Performed by: SURGERY

## 2023-10-05 DEVICE — VENTRALIGHT ST MESH WITH ECHO PS POSITONING SYSTEM
Type: IMPLANTABLE DEVICE | Site: UMBILICAL | Status: FUNCTIONAL
Brand: VENTRALIGHT ST MESH WITH ECHO PS POSITONING SYSTEM

## 2023-10-05 RX ORDER — MORPHINE SULFATE 4 MG/ML
2 INJECTION, SOLUTION INTRAMUSCULAR; INTRAVENOUS EVERY 10 MIN PRN
Status: DISCONTINUED | OUTPATIENT
Start: 2023-10-05 | End: 2023-10-05

## 2023-10-05 RX ORDER — ACETAMINOPHEN 500 MG
1000 TABLET ORAL ONCE
Status: COMPLETED | OUTPATIENT
Start: 2023-10-05 | End: 2023-10-05

## 2023-10-05 RX ORDER — LIDOCAINE HYDROCHLORIDE 10 MG/ML
INJECTION, SOLUTION EPIDURAL; INFILTRATION; INTRACAUDAL; PERINEURAL AS NEEDED
Status: DISCONTINUED | OUTPATIENT
Start: 2023-10-05 | End: 2023-10-05 | Stop reason: SURG

## 2023-10-05 RX ORDER — PROCHLORPERAZINE EDISYLATE 5 MG/ML
5 INJECTION INTRAMUSCULAR; INTRAVENOUS EVERY 8 HOURS PRN
Status: DISCONTINUED | OUTPATIENT
Start: 2023-10-05 | End: 2023-10-05

## 2023-10-05 RX ORDER — KETOROLAC TROMETHAMINE 15 MG/ML
15 INJECTION, SOLUTION INTRAMUSCULAR; INTRAVENOUS ONCE
Status: COMPLETED | OUTPATIENT
Start: 2023-10-05 | End: 2023-10-05

## 2023-10-05 RX ORDER — HYDROCODONE BITARTRATE AND ACETAMINOPHEN 10; 325 MG/1; MG/1
1 TABLET ORAL ONCE AS NEEDED
Status: COMPLETED | OUTPATIENT
Start: 2023-10-05 | End: 2023-10-05

## 2023-10-05 RX ORDER — ONDANSETRON 2 MG/ML
INJECTION INTRAMUSCULAR; INTRAVENOUS AS NEEDED
Status: DISCONTINUED | OUTPATIENT
Start: 2023-10-05 | End: 2023-10-05 | Stop reason: SURG

## 2023-10-05 RX ORDER — NALOXONE HYDROCHLORIDE 0.4 MG/ML
0.08 INJECTION, SOLUTION INTRAMUSCULAR; INTRAVENOUS; SUBCUTANEOUS AS NEEDED
Status: DISCONTINUED | OUTPATIENT
Start: 2023-10-05 | End: 2023-10-05

## 2023-10-05 RX ORDER — CEFAZOLIN SODIUM/WATER 2 G/20 ML
2 SYRINGE (ML) INTRAVENOUS ONCE
Status: COMPLETED | OUTPATIENT
Start: 2023-10-05 | End: 2023-10-05

## 2023-10-05 RX ORDER — DEXAMETHASONE SODIUM PHOSPHATE 4 MG/ML
VIAL (ML) INJECTION AS NEEDED
Status: DISCONTINUED | OUTPATIENT
Start: 2023-10-05 | End: 2023-10-05 | Stop reason: SURG

## 2023-10-05 RX ORDER — GLYCOPYRROLATE 0.2 MG/ML
INJECTION, SOLUTION INTRAMUSCULAR; INTRAVENOUS AS NEEDED
Status: DISCONTINUED | OUTPATIENT
Start: 2023-10-05 | End: 2023-10-05 | Stop reason: SURG

## 2023-10-05 RX ORDER — IBUPROFEN 600 MG/1
600 TABLET ORAL EVERY 6 HOURS PRN
Qty: 15 TABLET | Refills: 1 | Status: SHIPPED | OUTPATIENT
Start: 2023-10-05 | End: 2023-10-12

## 2023-10-05 RX ORDER — HYDROMORPHONE HYDROCHLORIDE 1 MG/ML
0.2 INJECTION, SOLUTION INTRAMUSCULAR; INTRAVENOUS; SUBCUTANEOUS EVERY 5 MIN PRN
Status: DISCONTINUED | OUTPATIENT
Start: 2023-10-05 | End: 2023-10-05

## 2023-10-05 RX ORDER — SODIUM CHLORIDE, SODIUM LACTATE, POTASSIUM CHLORIDE, CALCIUM CHLORIDE 600; 310; 30; 20 MG/100ML; MG/100ML; MG/100ML; MG/100ML
INJECTION, SOLUTION INTRAVENOUS CONTINUOUS
Status: DISCONTINUED | OUTPATIENT
Start: 2023-10-05 | End: 2023-10-05

## 2023-10-05 RX ORDER — ONDANSETRON 2 MG/ML
4 INJECTION INTRAMUSCULAR; INTRAVENOUS EVERY 6 HOURS PRN
Status: DISCONTINUED | OUTPATIENT
Start: 2023-10-05 | End: 2023-10-05

## 2023-10-05 RX ORDER — BUPIVACAINE HYDROCHLORIDE 2.5 MG/ML
INJECTION, SOLUTION EPIDURAL; INFILTRATION; INTRACAUDAL AS NEEDED
Status: DISCONTINUED | OUTPATIENT
Start: 2023-10-05 | End: 2023-10-05 | Stop reason: HOSPADM

## 2023-10-05 RX ORDER — METOCLOPRAMIDE 10 MG/1
10 TABLET ORAL ONCE
Status: COMPLETED | OUTPATIENT
Start: 2023-10-05 | End: 2023-10-05

## 2023-10-05 RX ORDER — FAMOTIDINE 20 MG/1
20 TABLET, FILM COATED ORAL ONCE
Status: DISCONTINUED | OUTPATIENT
Start: 2023-10-05 | End: 2023-10-05 | Stop reason: HOSPADM

## 2023-10-05 RX ORDER — HYDROMORPHONE HYDROCHLORIDE 1 MG/ML
0.4 INJECTION, SOLUTION INTRAMUSCULAR; INTRAVENOUS; SUBCUTANEOUS EVERY 5 MIN PRN
Status: DISCONTINUED | OUTPATIENT
Start: 2023-10-05 | End: 2023-10-05

## 2023-10-05 RX ORDER — MORPHINE SULFATE 4 MG/ML
4 INJECTION, SOLUTION INTRAMUSCULAR; INTRAVENOUS EVERY 10 MIN PRN
Status: DISCONTINUED | OUTPATIENT
Start: 2023-10-05 | End: 2023-10-05

## 2023-10-05 RX ORDER — MORPHINE SULFATE 10 MG/ML
6 INJECTION, SOLUTION INTRAMUSCULAR; INTRAVENOUS EVERY 10 MIN PRN
Status: DISCONTINUED | OUTPATIENT
Start: 2023-10-05 | End: 2023-10-05

## 2023-10-05 RX ORDER — ROCURONIUM BROMIDE 10 MG/ML
INJECTION, SOLUTION INTRAVENOUS AS NEEDED
Status: DISCONTINUED | OUTPATIENT
Start: 2023-10-05 | End: 2023-10-05 | Stop reason: SURG

## 2023-10-05 RX ORDER — HYDROMORPHONE HYDROCHLORIDE 1 MG/ML
0.6 INJECTION, SOLUTION INTRAMUSCULAR; INTRAVENOUS; SUBCUTANEOUS EVERY 5 MIN PRN
Status: DISCONTINUED | OUTPATIENT
Start: 2023-10-05 | End: 2023-10-05

## 2023-10-05 RX ORDER — EPHEDRINE SULFATE 50 MG/ML
INJECTION, SOLUTION INTRAVENOUS AS NEEDED
Status: DISCONTINUED | OUTPATIENT
Start: 2023-10-05 | End: 2023-10-05 | Stop reason: SURG

## 2023-10-05 RX ORDER — NEOSTIGMINE METHYLSULFATE 1 MG/ML
INJECTION, SOLUTION INTRAVENOUS AS NEEDED
Status: DISCONTINUED | OUTPATIENT
Start: 2023-10-05 | End: 2023-10-05 | Stop reason: SURG

## 2023-10-05 RX ORDER — HYDROCODONE BITARTRATE AND ACETAMINOPHEN 10; 325 MG/1; MG/1
1 TABLET ORAL EVERY 6 HOURS PRN
Qty: 10 TABLET | Refills: 0 | Status: SHIPPED | OUTPATIENT
Start: 2023-10-05

## 2023-10-05 RX ADMIN — EPHEDRINE SULFATE 10 MG: 50 INJECTION, SOLUTION INTRAVENOUS at 09:15:00

## 2023-10-05 RX ADMIN — DEXAMETHASONE SODIUM PHOSPHATE 4 MG: 4 MG/ML VIAL (ML) INJECTION at 09:05:00

## 2023-10-05 RX ADMIN — ONDANSETRON 4 MG: 2 INJECTION INTRAMUSCULAR; INTRAVENOUS at 09:22:00

## 2023-10-05 RX ADMIN — ROCURONIUM BROMIDE 5 MG: 10 INJECTION, SOLUTION INTRAVENOUS at 09:04:00

## 2023-10-05 RX ADMIN — GLYCOPYRROLATE 0.4 MG: 0.2 INJECTION, SOLUTION INTRAMUSCULAR; INTRAVENOUS at 09:24:00

## 2023-10-05 RX ADMIN — CEFAZOLIN SODIUM/WATER 2 G: 2 G/20 ML SYRINGE (ML) INTRAVENOUS at 09:09:00

## 2023-10-05 RX ADMIN — SODIUM CHLORIDE, SODIUM LACTATE, POTASSIUM CHLORIDE, CALCIUM CHLORIDE: 600; 310; 30; 20 INJECTION, SOLUTION INTRAVENOUS at 09:29:00

## 2023-10-05 RX ADMIN — NEOSTIGMINE METHYLSULFATE 3 MG: 1 INJECTION, SOLUTION INTRAVENOUS at 09:24:00

## 2023-10-05 RX ADMIN — LIDOCAINE HYDROCHLORIDE 50 MG: 10 INJECTION, SOLUTION EPIDURAL; INFILTRATION; INTRACAUDAL; PERINEURAL at 09:03:00

## 2023-10-05 RX ADMIN — ROCURONIUM BROMIDE 20 MG: 10 INJECTION, SOLUTION INTRAVENOUS at 09:08:00

## 2023-10-05 RX ADMIN — GLYCOPYRROLATE 0.2 MG: 0.2 INJECTION, SOLUTION INTRAMUSCULAR; INTRAVENOUS at 09:17:00

## 2023-10-05 NOTE — ANESTHESIA PROCEDURE NOTES
Airway  Date/Time: 10/5/2023 9:07 AM  Urgency: elective    Airway not difficult    General Information and Staff    Patient location during procedure: OR  Anesthesiologist: Selma Danielle MD  Performed: anesthesiologist   Performed by: Selma Danielle MD  Authorized by: Selma Danielle MD      Indications and Patient Condition  Indications for airway management: anesthesia  Spontaneous Ventilation: absent  Sedation level: deep  Preoxygenated: yes  Patient position: sniffing  Mask difficulty assessment: 2 - vent by mask + OA or adjuvant +/- NMBA    Final Airway Details  Final airway type: endotracheal airway      Successful airway: ETT  Cuffed: yes   Successful intubation technique: direct laryngoscopy  Endotracheal tube insertion site: oral  Blade: Tyree  Blade size: #3  ETT size (mm): 7.5    Cormack-Lehane Classification: grade III - view of epiglottis only  Placement verified by: capnometry   Cuff volume (mL): 7  Measured from: lips  ETT to lips (cm): 23  Number of attempts at approach: 1  Number of other approaches attempted: 0

## 2023-10-05 NOTE — INTERVAL H&P NOTE
Pre-op Diagnosis: Umbilical hernia without obstruction and without gangrene [K42.9]    The above referenced H&P was reviewed by Fadumo Yu MD on 10/5/2023, the patient was examined and no significant changes have occurred in the patient's condition since the H&P was performed. I discussed with the patient and/or legal representative the potential benefits, risks and side effects of this procedure; the likelihood of the patient achieving goals; and potential problems that might occur during recuperation. I discussed reasonable alternatives to the procedure, including risks, benefits and side effects related to the alternatives and risks related to not receiving this procedure. We will proceed with procedure as planned.

## 2023-10-05 NOTE — OPERATIVE REPORT
[facility]    PATIENT'S NAME: Benji Blocker   ATTENDING PHYSICIAN: Dayna Bello MD   OPERATING PHYSICIAN: Dayna Bello MD   PATIENT ACCOUNT#:   [de-identified]    LOCATION:  [address]  MEDICAL RECORD #:   [id number]       YOB: 1960  ADMISSION DATE:       10/05/2023      OPERATION DATE:  10/05/2023    OPERATIVE REPORT      PREOPERATIVE DIAGNOSIS:  Incarcerated umbilical hernia. POSTOPERATIVE DIAGNOSIS:  Incarcerated umbilical hernia. PROCEDURE:  Laparoscopic repair of incarcerated umbilical hernia with mesh. ASSISTANT:  AMRITA Gutiérrez. ESTIMATED BLOOD LOSS:  5 mL. COMPLICATIONS:  None. ANESTHESIA:  General.    DISPOSITION:  To Recovery, tolerated well. INDICATIONS:  The patient is 59 with the above complaint. Consent obtained. OPERATIVE TECHNIQUE:  He was taken to surgery. He was prepped and draped in the usual sterile fashion. A Veress needle placed at Bowden's point. Abdomen insufflated. A 5 mm placed using Optiview. Two additional trocars were placed in the left flank. Exploration reveals a knuckle of omentum stuck within a umbilical defect. Using EnSeal, the contents are completely reduced and amputated. The sac is excised using EnSeal as well. The defect measures approximately 2.5 cm. A small incision is made at the base of the umbilicus using the needle passer; 0 Vicryl sutures are used to close the defect. The 4.5 Ventralight is rolled, placed intraabdominally, the balloon deployed, and secured using a double-crown technique of SecureStrap. The balloon retrieved intact. Hemostasis maintained. Trocars removed. The 12 mm site closed with 0 Vicryl. Skin closed with 3-0 Monocryl. Benzoin and Steri-Strips. Dictated By Dayna Bello MD  d: 10/05/2023 09:29:39  t: 10/05/2023 10:31:18  Job [id number]  GL/    cc: Bisi Salazar MD

## 2023-10-18 ENCOUNTER — OFFICE VISIT (OUTPATIENT)
Dept: SURGERY | Facility: CLINIC | Age: 63
End: 2023-10-18
Payer: COMMERCIAL

## 2023-10-18 DIAGNOSIS — Z48.89 ENCOUNTER FOR POSTOPERATIVE CARE: Primary | ICD-10-CM

## 2023-10-18 PROCEDURE — 99212 OFFICE O/P EST SF 10 MIN: CPT

## 2023-10-18 NOTE — PROGRESS NOTES
S:  Ray Virgen is a 59year old male sp lap ventral hernia repair with mesh  Doing well, tolerating a general diet with normal bowel habits      O:  There were no vitals taken for this visit. GEN:  No acute distress  Abd:  Soft, NTND, incisions C/D/I      Assessment   Encounter for postoperative care  (primary encounter diagnosis)    Doing well sp ventral hernia repair with mesh. Continue to avoid heavy lifting for another month. F/u prn.          Lo Coley PA-C

## 2023-12-21 ENCOUNTER — NURSE TRIAGE (OUTPATIENT)
Dept: INTERNAL MEDICINE CLINIC | Facility: CLINIC | Age: 63
End: 2023-12-21

## 2023-12-21 ENCOUNTER — TELEMEDICINE (OUTPATIENT)
Facility: LOCATION | Age: 63
End: 2023-12-21
Payer: COMMERCIAL

## 2023-12-21 DIAGNOSIS — J32.9 SINUSITIS, BACTERIAL: Primary | ICD-10-CM

## 2023-12-21 DIAGNOSIS — B96.89 SINUSITIS, BACTERIAL: Primary | ICD-10-CM

## 2023-12-21 PROCEDURE — 99213 OFFICE O/P EST LOW 20 MIN: CPT | Performed by: INTERNAL MEDICINE

## 2023-12-21 RX ORDER — AMOXICILLIN AND CLAVULANATE POTASSIUM 875; 125 MG/1; MG/1
1 TABLET, FILM COATED ORAL 2 TIMES DAILY
Qty: 20 TABLET | Refills: 0 | Status: SHIPPED | OUTPATIENT
Start: 2023-12-21 | End: 2023-12-31

## 2023-12-21 RX ORDER — METHYLPREDNISOLONE 4 MG/1
TABLET ORAL
Qty: 1 EACH | Refills: 0 | Status: SHIPPED | OUTPATIENT
Start: 2023-12-21

## 2023-12-21 NOTE — PROGRESS NOTES
INTERNAL MEDICINE VIDEO VISIT NOTE     Patient ID: Hillary Sandhu is a 59year old male. Today's Date: 12/21/23  Chief Complaint: No chief complaint on file. Sinusitis  This is a new problem. The current episode started in the past 7 days. The problem has been gradually worsening since onset. The maximum temperature recorded prior to his arrival was 100.4 - 100.9 F. The pain is moderate. Associated symptoms include congestion, coughing, headaches and sinus pressure. Past treatments include oral decongestants. The treatment provided mild relief. Hernia surgery went well. There were no vitals filed for this visit. body mass index is unknown because there is no height or weight on file. BP Readings from Last 3 Encounters:   10/05/23 128/68   09/08/23 116/58   09/05/23 146/68     The ASCVD Risk score (Joseph BRYANT, et al., 2019) failed to calculate for the following reasons: The valid total cholesterol range is 130 to 320 mg/dL  Medications reviewed:  Current Outpatient Medications   Medication Sig Dispense Refill    amoxicillin clavulanate 875-125 MG Oral Tab Take 1 tablet by mouth 2 (two) times daily for 10 days. 20 tablet 0    methylPREDNISolone (MEDROL) 4 MG Oral Tablet Therapy Pack As directed. 1 each 0    HYDROcodone-acetaminophen (NORCO)  MG Oral Tab Take 1 tablet by mouth every 6 (six) hours as needed for Pain. 10 tablet 0    chlorthalidone 25 MG Oral Tab Take 1 tablet (25 mg total) by mouth daily. FOR BLOOD PRESSURE. 90 tablet 9    nebivolol (BYSTOLIC) 10 MG Oral Tab Take 1 tablet (10 mg total) by mouth daily. FOR BLOOD PRESSURE. 90 tablet 9    rosuvastatin 5 MG Oral Tab Take 1 tablet (5 mg total) by mouth nightly. FOR CHOLESTEROL. 90 tablet 9    telmisartan 80 MG Oral Tab Take 1 tablet (80 mg total) by mouth at bedtime. FOR BLOOD PRESSURE.  90 tablet 9    fluticasone-salmeterol (ADVAIR DISKUS) 250-50 MCG/ACT Inhalation Aerosol Powder, Breath Activated Inhale 1 puff into the lungs every 12 (twelve) hours. 3 each 9    albuterol 108 (90 Base) MCG/ACT Inhalation Aero Soln Inhale 2 puffs into the lungs every 4 (four) hours as needed for Wheezing or Shortness of Breath. 25.5 g 0    omeprazole 20 MG Oral Capsule Delayed Release Take 1 capsule (20 mg total) by mouth every morning. FOR ACID REFLUX. 90 capsule 3         Assessment & Plan    1. Sinusitis, bacterial (Primary)  -     Amoxicillin-Pot Clavulanate; Take 1 tablet by mouth 2 (two) times daily for 10 days. Dispense: 20 tablet; Refill: 0  -     methylPREDNISolone; As directed. Dispense: 1 each; Refill: 0  Plan  As above    Follow Up: Return for AS NEEDED/ IF SYMPTOMS WORSEN. .         Objective: Results:   Physical Exam  Vitals and nursing note reviewed. Constitutional:       General: He is not in acute distress. Appearance: Normal appearance. HENT:      Head: Normocephalic. Right Ear: External ear normal.      Left Ear: External ear normal.      Nose: Nasal tenderness, congestion and rhinorrhea present. Right Sinus: Maxillary sinus tenderness and frontal sinus tenderness present. Left Sinus: Maxillary sinus tenderness and frontal sinus tenderness present. Eyes:      Extraocular Movements: Extraocular movements intact. Conjunctiva/sclera: Conjunctivae normal.   Pulmonary:      Effort: Pulmonary effort is normal.   Musculoskeletal:         General: Normal range of motion. Cervical back: Normal range of motion and neck supple. Skin:     Coloration: Skin is not jaundiced. Neurological:      General: No focal deficit present. Mental Status: He is alert and oriented to person, place, and time. Mental status is at baseline.    Psychiatric:         Mood and Affect: Mood normal.         Behavior: Behavior normal.        Reviewed:  Patient Active Problem List    Diagnosis    Umbilical hernia with obstruction, without gangrene    Mixed hyperlipidemia    Severe obesity (BMI 35.0-35.9 with comorbidity) ([facility].)    FAY on CPAP     CPAP      Hypertension goal BP (blood pressure) < 130/80    Allergic rhinitis    GERD (gastroesophageal reflux disease)      No Known Allergies     Social History     Socioeconomic History    Marital status:    Tobacco Use    Smoking status: Never     Passive exposure: Never    Smokeless tobacco: Never   Vaping Use    Vaping Use: Never used   Substance and Sexual Activity    Alcohol use: Yes     Alcohol/week: 1.0 standard drink of alcohol     Types: 1 Standard drinks or equivalent per week     Comment: social    Drug use: No   Other Topics Concern    Caffeine Concern Yes     Comment: Coffee, Soda 3 cups daily; Review of Systems   HENT:  Positive for congestion and sinus pressure. Respiratory:  Positive for cough. Neurological:  Positive for headaches. All other systems negative unless otherwise stated in ROS or HPI above. Amada Hogue MD  Internal Medicine       Call office with any questions or seek emergency care if necessary. Patient understands and agrees to follow directions and advice. Telehealth Verbal Consent   I conducted a telehealth visit with Kylee Phillips today, 12/21/23, which was completed using two-way, real-time interactive audio and video communication. This has been done in good keila to provide continuity of care in the best interest of the provider-patient relationship, due to the COVID -19 public health crisis/national emergency where restrictions of face-to-face office visits are ongoing. Every conscious effort was taken to allow for sufficient and adequate time to complete the visit. The patient was made aware of the limitations of the telehealth visit, including treatment limitations as no physical exam could be performed. The patient was advised to call 911 or to go to the ER in case there was an emergency. The patient was also advised of the potential privacy & security concerns related to the telehealth platform.  The patient was made aware of where to find [facility] notice of privacy practices, telehealth consent form and other related consent forms and documents. which are located on the [facility] website. The patient verbally agreed to telehealth consent form, related consents and the risks discussed. Lastly, the patient confirmed that they were in [place]. Included in this visit, time may have been spent reviewing labs, medications, radiology tests and decision making. Appropriate medical decision-making and tests are ordered as detailed in the plan of care above. Coding/billing information is submitted for this visit based on complexity of care and/or time spent for the visit.  ----------------------------------------- PATIENT INSTRUCTIONS-----------------------------------------     There are no Patient Instructions on file for this visit.

## 2023-12-21 NOTE — TELEPHONE ENCOUNTER
Spouse Nela called on behalf of patient. Patient is seeking video visit for cold symptoms; feels miserable. Patient currently in [place] at work. Unfortunately we cannot schedule appt due to our policy. He will need to be in [place] for video visit. (Or office visit). Call back if he will be home later today. We can get him appt. Otherwise, he may go to nearest walk in center in [place]. Spouse verbalized understanding.

## 2023-12-21 NOTE — TELEPHONE ENCOUNTER
Action Requested: Summary for Provider     []  Critical Lab, Recommendations Needed  [] Need Additional Advice  []   FYI    []   Need Orders  [] Need Medications Sent to Pharmacy  []  Other     SUMMARY: Per Protocol disposition advised to be seen for sinus symptoms. Patient was also instructed to take a covid test today and call if it is positive. Patient was informed of mask policy. Assisted patient with appt scheduling, verbalized understanding and agrees to plan. Future Appointments   Date Time Provider Marisela Patricia   2023  8:00 AM Latha Pabon MD [facility]     Reason for call: Sinus Problem  Onset: 6 days    Patient (name and  verified) c/o sinus congestion for 6 days. Patient is requesting an antibiotic. Patient c/o sinus drainage with facial pressure. Patient c/o cough. Denies any fever. Patient has not done any covid testing.    Reason for Disposition   Patient wants to be seen    Protocols used: Sinus Pain and Congestion-A-[place]

## 2024-03-22 ENCOUNTER — OFFICE VISIT (OUTPATIENT)
Facility: LOCATION | Age: 64
End: 2024-03-22

## 2024-03-22 ENCOUNTER — LAB ENCOUNTER (OUTPATIENT)
Dept: LAB | Facility: REFERENCE LAB | Age: 64
End: 2024-03-22
Attending: INTERNAL MEDICINE
Payer: COMMERCIAL

## 2024-03-22 VITALS
WEIGHT: 252 LBS | HEART RATE: 64 BPM | DIASTOLIC BLOOD PRESSURE: 72 MMHG | SYSTOLIC BLOOD PRESSURE: 129 MMHG | OXYGEN SATURATION: 96 % | HEIGHT: 70 IN | BODY MASS INDEX: 36.08 KG/M2

## 2024-03-22 DIAGNOSIS — M10.9 ACUTE GOUT OF RIGHT ANKLE, UNSPECIFIED CAUSE: ICD-10-CM

## 2024-03-22 DIAGNOSIS — M10.9 ACUTE GOUT OF RIGHT ANKLE, UNSPECIFIED CAUSE: Primary | ICD-10-CM

## 2024-03-22 LAB
ANION GAP SERPL CALC-SCNC: 8 MMOL/L (ref 0–18)
BUN BLD-MCNC: 19 MG/DL (ref 9–23)
BUN/CREAT SERPL: 16.7 (ref 10–20)
CALCIUM BLD-MCNC: 9.5 MG/DL (ref 8.7–10.4)
CHLORIDE SERPL-SCNC: 103 MMOL/L (ref 98–112)
CO2 SERPL-SCNC: 29 MMOL/L (ref 21–32)
CREAT BLD-MCNC: 1.14 MG/DL
EGFRCR SERPLBLD CKD-EPI 2021: 73 ML/MIN/1.73M2 (ref 60–?)
FASTING STATUS PATIENT QL REPORTED: NO
GLUCOSE BLD-MCNC: 199 MG/DL (ref 70–99)
OSMOLALITY SERPL CALC.SUM OF ELEC: 298 MOSM/KG (ref 275–295)
POTASSIUM SERPL-SCNC: 4.1 MMOL/L (ref 3.5–5.1)
SODIUM SERPL-SCNC: 140 MMOL/L (ref 136–145)
URATE SERPL-MCNC: 9.7 MG/DL

## 2024-03-22 PROCEDURE — 80048 BASIC METABOLIC PNL TOTAL CA: CPT

## 2024-03-22 PROCEDURE — 99214 OFFICE O/P EST MOD 30 MIN: CPT | Performed by: INTERNAL MEDICINE

## 2024-03-22 PROCEDURE — 36415 COLL VENOUS BLD VENIPUNCTURE: CPT

## 2024-03-22 PROCEDURE — 84550 ASSAY OF BLOOD/URIC ACID: CPT

## 2024-03-22 RX ORDER — PREDNISONE 10 MG/1
TABLET ORAL
Qty: 18 TABLET | Refills: 0 | Status: SHIPPED | OUTPATIENT
Start: 2024-03-22 | End: 2024-03-31

## 2024-03-22 RX ORDER — HYDROCODONE BITARTRATE AND ACETAMINOPHEN 5; 325 MG/1; MG/1
1 TABLET ORAL EVERY 6 HOURS PRN
Qty: 28 TABLET | Refills: 0 | Status: SHIPPED | OUTPATIENT
Start: 2024-03-22 | End: 2024-03-29

## 2024-03-22 NOTE — PROGRESS NOTES
INTERNAL MEDICINE OFFICE NOTE     Patient ID: Andres Kang is a 63 year old male.  Today's Date: 03/22/24  Chief Complaint: Ankle Pain (Ankle pain on and off. he thinks its gout. )    HPI  Pleasant 61-year-old gentleman presents with right ankle pain.  A few months ago he had a similar pain which resolved with NSAIDs however he has been walking a lot recently and noted that his right ankle has been very painful.  This morning had quite a bit of pain stepping on it he took 1 NSAID with some relief.  This is been going on for about a week or so.  States mom also has a history of gout.    Vitals:    03/22/24 0854   BP: 129/72   Pulse: 64   SpO2: 96%   Weight: 252 lb (114.3 kg)   Height: 5' 10\" (1.778 m)     body mass index is 36.16 kg/m².  BP Readings from Last 3 Encounters:   03/22/24 129/72   10/05/23 128/68   09/08/23 116/58     The ASCVD Risk score (Joseph BRYANT, et al., 2019) failed to calculate for the following reasons:    The valid total cholesterol range is 130 to 320 mg/dL  Medications reviewed:  Current Outpatient Medications   Medication Sig Dispense Refill    predniSONE 10 MG Oral Tab Take 3 tablets (30 mg total) by mouth daily for 3 days, THEN 2 tablets (20 mg total) daily for 3 days, THEN 1 tablet (10 mg total) daily for 3 days. TAKE WITH FOOD.. 18 tablet 0    HYDROcodone-acetaminophen (NORCO) 5-325 MG Oral Tab Take 1 tablet by mouth every 6 (six) hours as needed (FOR SEVERE PAIN.). 28 tablet 0    predniSONE 10 MG Oral Tab Take 3 tablets (30 mg total) by mouth daily for 3 days, THEN 2 tablets (20 mg total) daily for 3 days, THEN 1 tablet (10 mg total) daily for 3 days. TAKE WITH FOOD.. 18 tablet 0    methylPREDNISolone (MEDROL) 4 MG Oral Tablet Therapy Pack As directed. 1 each 0    HYDROcodone-acetaminophen (NORCO)  MG Oral Tab Take 1 tablet by mouth every 6 (six) hours as needed for Pain. 10 tablet 0    chlorthalidone 25 MG Oral Tab Take 1 tablet (25 mg total) by mouth daily. FOR  BLOOD PRESSURE. 90 tablet 9    nebivolol (BYSTOLIC) 10 MG Oral Tab Take 1 tablet (10 mg total) by mouth daily. FOR BLOOD PRESSURE. 90 tablet 9    rosuvastatin 5 MG Oral Tab Take 1 tablet (5 mg total) by mouth nightly. FOR CHOLESTEROL. 90 tablet 9    telmisartan 80 MG Oral Tab Take 1 tablet (80 mg total) by mouth at bedtime. FOR BLOOD PRESSURE. 90 tablet 9    fluticasone-salmeterol (ADVAIR DISKUS) 250-50 MCG/ACT Inhalation Aerosol Powder, Breath Activated Inhale 1 puff into the lungs every 12 (twelve) hours. 3 each 9    albuterol 108 (90 Base) MCG/ACT Inhalation Aero Soln Inhale 2 puffs into the lungs every 4 (four) hours as needed for Wheezing or Shortness of Breath. 25.5 g 0    omeprazole 20 MG Oral Capsule Delayed Release Take 1 capsule (20 mg total) by mouth every morning. FOR ACID REFLUX. 90 capsule 3         Assessment & Plan    1. Acute gout of right ankle, unspecified cause (Primary)  -     Uric Acid; Future; Expected date: 03/22/2024  -     Basic Metabolic Panel (8); Future; Expected date: 03/22/2024  -     predniSONE; Take 3 tablets (30 mg total) by mouth daily for 3 days, THEN 2 tablets (20 mg total) daily for 3 days, THEN 1 tablet (10 mg total) daily for 3 days. TAKE WITH FOOD..  Dispense: 18 tablet; Refill: 0  -     HYDROcodone-Acetaminophen; Take 1 tablet by mouth every 6 (six) hours as needed (FOR SEVERE PAIN.).  Dispense: 28 tablet; Refill: 0  -     predniSONE; Take 3 tablets (30 mg total) by mouth daily for 3 days, THEN 2 tablets (20 mg total) daily for 3 days, THEN 1 tablet (10 mg total) daily for 3 days. TAKE WITH FOOD..  Dispense: 18 tablet; Refill: 0  Plan  High suspicion this is gout most likely his second episode given his prior mention of ankle pain resolved with NSAID, given duration we will start him on some prednisone, I have given him Norco to help with the severe pain as he is going to be traveling, will check uric acid and BMP given this is second episode it might be elevated, if not we  will recheck in about 4 weeks after the acute phase resolves.  I have given him an additional printed prescription for prednisone which I would like him to fill and take with him when he leaves the country, I have also given him a note stating it is okay to fly with opiates and prednisone internationally.    Follow Up: Return for AS NEEDED/ IF SYMPTOMS WORSEN..         Objective: Results:   Physical Exam  Vitals and nursing note reviewed.   Constitutional:       General: He is not in acute distress.     Appearance: Normal appearance.   HENT:      Head: Normocephalic.      Right Ear: External ear normal.      Left Ear: External ear normal.   Eyes:      Extraocular Movements: Extraocular movements intact.      Conjunctiva/sclera: Conjunctivae normal.   Pulmonary:      Effort: Pulmonary effort is normal.   Musculoskeletal:      Cervical back: Normal range of motion and neck supple.      Right ankle: Swelling present. Tenderness present. Decreased range of motion.   Skin:     Coloration: Skin is not jaundiced.   Neurological:      General: No focal deficit present.      Mental Status: He is alert and oriented to person, place, and time. Mental status is at baseline.   Psychiatric:         Mood and Affect: Mood normal.         Behavior: Behavior normal.        Reviewed:    Patient Active Problem List    Diagnosis    Umbilical hernia with obstruction, without gangrene    Mixed hyperlipidemia    Severe obesity (BMI 35.0-35.9 with comorbidity) (HCC)    FAY on CPAP     CPAP      Hypertension goal BP (blood pressure) < 130/80    Allergic rhinitis    GERD (gastroesophageal reflux disease)      No Known Allergies     Social History     Socioeconomic History    Marital status:    Tobacco Use    Smoking status: Never     Passive exposure: Never    Smokeless tobacco: Never   Vaping Use    Vaping Use: Never used   Substance and Sexual Activity    Alcohol use: Yes     Alcohol/week: 1.0 standard drink of alcohol     Types: 1  Standard drinks or equivalent per week     Comment: social    Drug use: No   Other Topics Concern    Caffeine Concern Yes     Comment: Coffee, Soda 3 cups daily;       Review of Systems  All other systems negative unless otherwise stated in ROS or HPI above.       Mauricio Geiger MD  Internal Medicine       Call office with any questions or seek emergency care if necessary.   Patient understands and agrees to follow directions and advice.      ----------------------------------------- PATIENT INSTRUCTIONS-----------------------------------------   .    There are no Patient Instructions on file for this visit.

## 2024-03-27 ENCOUNTER — PATIENT MESSAGE (OUTPATIENT)
Facility: LOCATION | Age: 64
End: 2024-03-27

## 2024-03-27 DIAGNOSIS — M10.9 ACUTE GOUT OF RIGHT ANKLE, UNSPECIFIED CAUSE: ICD-10-CM

## 2024-03-28 ENCOUNTER — TELEPHONE (OUTPATIENT)
Dept: INTERNAL MEDICINE CLINIC | Facility: CLINIC | Age: 64
End: 2024-03-28

## 2024-03-28 RX ORDER — PREDNISONE 10 MG/1
TABLET ORAL
Qty: 18 TABLET | Refills: 0 | Status: SHIPPED | OUTPATIENT
Start: 2024-03-28 | End: 2024-04-05

## 2024-03-28 NOTE — TELEPHONE ENCOUNTER
From: Andres Kang  To: Mauricio Geiger  Sent: 3/27/2024 8:49 PM CDT  Subject: Uric Acid / Gout issue    I took the Prednisone and went to [facility] the last 4 days and the Gout was not an issue so it seemed to work. I did take some OTC pain med (Ibuprofen) and that seemed to be fine. I did not have to take any of the Norco. I am leaving Monday after [facility] for [facility] and want to take another dose of Prednisone with me just in case (Not start the dosage unless it acts up). I see that you wrote 2 RX's for this but I dont think the pharmacy processed both. Can you make sure I can get another 18 of these to take with me just in case.  I agree that I dont want to change my BP meds as that is working good. Lets see if it flairs again before we make any changes to my BP meds  Thanks DR Andres Kang  [phone]

## 2024-03-28 NOTE — TELEPHONE ENCOUNTER
Patient requesting a Rx of Prednisone to take with him as he will be traveling to [place].   Medication pended for review.

## 2024-03-28 NOTE — TELEPHONE ENCOUNTER
Please see encounter from 3/27, patient said he still needing second script for medication below, [place] and [place].     [id number] - [place], [place] -   [address]  [address]       Medication Detail      Medication Quantity Refills Start End   predniSONE 10 MG Oral Tab 18 tablet 0 3/22/2024 3/31/2024   Sig:   Take 3 tablets (30 mg total) by mouth daily for 3 days, THEN 2 tablets (20 mg total) daily for 3 days, THEN 1 tablet (10 mg total) daily for 3 days. TAKE WITH FOOD..     Route:   Oral     Order #:   [id number]     Dosage Start Date End Date        30 mg Daily 03/22/24 03/24/24        20 mg Daily 03/25/24 03/27/24        10 mg Daily 03/28/24 03/30/24

## 2024-07-25 ENCOUNTER — NURSE TRIAGE (OUTPATIENT)
Facility: LOCATION | Age: 64
End: 2024-07-25

## 2024-07-25 ENCOUNTER — APPOINTMENT (OUTPATIENT)
Dept: CT IMAGING | Facility: HOSPITAL | Age: 64
End: 2024-07-25
Attending: EMERGENCY MEDICINE
Payer: COMMERCIAL

## 2024-07-25 ENCOUNTER — HOSPITAL ENCOUNTER (EMERGENCY)
Facility: HOSPITAL | Age: 64
Discharge: HOME | End: 2024-07-25
Attending: EMERGENCY MEDICINE
Payer: COMMERCIAL

## 2024-07-25 VITALS
OXYGEN SATURATION: 99 % | RESPIRATION RATE: 18 BRPM | DIASTOLIC BLOOD PRESSURE: 67 MMHG | HEART RATE: 58 BPM | WEIGHT: 260 LBS | BODY MASS INDEX: 37.22 KG/M2 | TEMPERATURE: 98 F | HEIGHT: 70 IN | SYSTOLIC BLOOD PRESSURE: 128 MMHG

## 2024-07-25 DIAGNOSIS — S05.8X1A BLUNT TRAUMA OF RIGHT EYE, INITIAL ENCOUNTER: Primary | ICD-10-CM

## 2024-07-25 PROCEDURE — 99284 EMERGENCY DEPT VISIT MOD MDM: CPT

## 2024-07-25 PROCEDURE — 70480 CT ORBIT/EAR/FOSSA W/O DYE: CPT | Performed by: EMERGENCY MEDICINE

## 2024-07-25 PROCEDURE — 99285 EMERGENCY DEPT VISIT HI MDM: CPT

## 2024-07-25 PROCEDURE — 70450 CT HEAD/BRAIN W/O DYE: CPT | Performed by: EMERGENCY MEDICINE

## 2024-07-25 NOTE — ED INITIAL ASSESSMENT (HPI)
Pt presents to the ER with c/o blurry vision and seeing spots out of right eye since yesterday. Pt reports being hit in the eye on Tuesday by a baseball at the [employer] game.     Denies LOC. Not on blood thinner

## 2024-07-25 NOTE — TELEPHONE ENCOUNTER
Action Requested: Summary for Provider     []  Critical Lab, Recommendations Needed  [] Need Additional Advice  [x]   FYI    []   Need Orders  [] Need Medications Sent to Pharmacy  []  Other     SUMMARY: Patient calling, he said Tuesday evening a baseball hit him in the head. No LOC at the time. He has black eye. He did apply ice at time of injury and has continued that, had a slight headache yesterday that did subside with advil and a nap.     Last night noticed vision changes in one eye on the side he was hit on. Says he sees \"bubbles and white splotches\" in field of vision. He did drive to work today, just on board [employer] now. Advised nearest ER now for evaluation. May need eye evaluation and if so patient to call us after seen in ER for recommendations although advised need to get to ER and have imaging and eval done now. Advised best to have someone drive him and if symptoms change to call 911.      Patient agreeable and states understanding.     Reason for call: Head Injury  Onset: Tuesday evening       Reason for Disposition   Dangerous injury (e.g., MVA, diving, trampoline, contact sports, fall > 10 feet or 3 meters) or severe blow from hard object (e.g., golf club or baseball bat)    Protocols used: Head Injury-A-[place]

## 2024-07-25 NOTE — TELEPHONE ENCOUNTER
Noted agree with RN advice, needs CT imaging maxillofacial along with ocular exam given trauma. Go to closet ER. Idris.

## 2024-07-25 NOTE — ED PROVIDER NOTES
Patient Seen in: [facility] Emergency Department    History     Chief Complaint   Patient presents with    Eye Visual Problem       HPI    With complaints of vision changes to his right eye.  Patient states that 2 nights ago he was at the [employer] game he was hit with a fly ball to his eye.  Patient with positive bruising to the eye.  Patient states that she is negative is fine but then yesterday started developing blurring of his vision on the right eye.  Patient states \"it feels like looking through a fog.\"  Patient also complained of seeing floaters only to the right eye.  Patient denies any vision changes.  Patient with bruising below the right eye.    History reviewed.   Past Medical History:    Asthma (HCC)    Esophageal reflux    Essential hypertension    Extrinsic asthma, unspecified    Gastritis    EGD with Biopsy    Hx of hernia repair    Other and unspecified hyperlipidemia    Seasonal allergies    Sleep apnea    Visual impairment       History reviewed.   Past Surgical History:   Procedure Laterality Date    Colonoscopy  01/01/2010    Inguinal hernia repair Right     Late 1980's to early 1990's per patient    Umbilical hernia repair  10/05/2023    Laparoscopic repair of incarcerated umbilical hernia with mesh    Upper gi endoscopy,biopsy  01/01/2010    EGD with Biopsy         Medications :  (Not in a hospital admission)       Family History   Problem Relation Age of Onset    Other (MVA[other]) Father 42        MVA; Cause of death       Smoking Status:   Social History     Socioeconomic History    Marital status:    Tobacco Use    Smoking status: Never     Passive exposure: Never    Smokeless tobacco: Never   Vaping Use    Vaping status: Never Used   Substance and Sexual Activity    Alcohol use: Yes     Alcohol/week: 1.0 standard drink of alcohol     Types: 1 Standard drinks or equivalent per week     Comment: social    Drug use: No   Other Topics Concern    Caffeine Concern Yes     Comment:  Coffee, Soda 3 cups daily;        ROS  All pertinent positives for the review of systems are mentioned in the HPI  All other organ systems are reviewed and are negative.    Constitutional and vital signs reviewed.      Social History and Family History elements reviewed from today, pertinent positives to the presenting problem noted.    Physical Exam     ED Triage Vitals [07/25/24 1049]   BP (!) 156/93   Pulse 61   Resp 18   Temp 98.4 °F (36.9 °C)   Temp src Oral   SpO2 99 %   O2 Device None (Room air)       All measures to prevent infection transmission during my interaction with the patient were taken. The patient was already wearing a droplet mask on my arrival to the room. Personal protective equipment including droplet mask, eye protection, and gloves were worn throughout the duration of the exam.  Handwashing was performed prior to and after the exam.  Stethoscope and any equipment used during my examination was cleaned with super sani-cloth germicidal wipes following the exam.     Physical Exam  Vitals and nursing note reviewed.   Constitutional:       Appearance: He is well-developed.   HENT:      Head: Normocephalic and atraumatic.      Right Ear: External ear normal.      Left Ear: External ear normal.      Nose: Nose normal.   Eyes:      General: Lids are normal. Vision grossly intact. Gaze aligned appropriately.         Right eye: No foreign body.         Left eye: No foreign body.      Extraocular Movements: Extraocular movements intact.      Conjunctiva/sclera: Conjunctivae normal.      Pupils: Pupils are equal, round, and reactive to light.      Comments: Positive bruising and swelling to the right lid below the eye, eye itself without any hemorrhage chemosis or injection   Cardiovascular:      Rate and Rhythm: Normal rate and regular rhythm.      Heart sounds: Normal heart sounds.   Pulmonary:      Effort: Pulmonary effort is normal.      Breath sounds: Normal breath sounds.   Abdominal:       General: Bowel sounds are normal.      Palpations: Abdomen is soft.   Musculoskeletal:         General: Normal range of motion.      Cervical back: Normal range of motion and neck supple.   Skin:     General: Skin is warm and dry.   Neurological:      Mental Status: He is alert and oriented to person, place, and time.      Deep Tendon Reflexes: Reflexes are normal and symmetric.   Psychiatric:         Behavior: Behavior normal.         Thought Content: Thought content normal.         Judgment: Judgment normal.         ED Course      Labs Reviewed - No data to display      Imaging Results Available and Reviewed while in ED: CT ORBITS (CPT=70480)    Result Date: 7/25/2024  CONCLUSION:  1. Right infraorbital/premalar soft tissue injury.  No associated acute orbital fracture. 2. Lesser incidental findings as above.   Dictated by (CST): Ken Emerson MD on 7/25/2024 at 12:11 PM     Finalized by (CST): Ken Emerson MD on 7/25/2024 at 12:15 PM          CT BRAIN OR HEAD (CPT=70450)    Result Date: 7/25/2024  CONCLUSION:  1. No acute intracranial process by noncontrast CT technique. 2. Right infraorbital and premalar soft tissue injury. 3. Intracranial atherosclerosis. 4. Partially imaged suspected chronic fracture deformity at the left occipital condyle and left lateral mass of C1.   Dictated by (CST): Ken Emerson MD on 7/25/2024 at 12:08 PM     Finalized by (CST): Ken Emerson MD on 7/25/2024 at 12:11 PM         ED Medications Administered: Medications - No data to display      MDM     Vitals:    07/25/24 1049   BP: (!) 156/93   Pulse: 61   Resp: 18   Temp: 98.4 °F (36.9 °C)   TempSrc: Oral   SpO2: 99%   Weight: 117.9 kg   Height: 177.8 cm (5' 10\")     *I personally reviewed and interpreted all ED vitals.  I also personally reviewed all labs and imaging if ordered    Pulse Ox: 99%, Room air, Normal     Monitor Interpretation:   normal sinus rhythm    Differential Diagnosis/ Diagnostic Considerations: Retinal  detachment, optic nerve injury, eye trauma    Medical Record Review: I personally reviewed available prior medical records for any recent pertinent discharge summaries, testing, and procedures and reviewed those reports.    Complicating Factors: The patient already has does not have any pertinent problems on file. to contribute to the complexity of this ED evaluation.    Medical Decision Making  64-year-old male presents ER with complaint of right eye trauma.  Patient complaining of blurring of vision and seeing spots.  Patient CT of his brain and orbit showed no acute fracture or orbital entrapment.  Globe intact.  Patient with good ocular movement as well as pupillary reflex.  Discussed with Dr. Huertas from ophthalmology who states that he will see the patient in his office today if we discharge him and sent him straight to the office.  Patient understands instructions to go to his office once discharged    Problems Addressed:  Blunt trauma of right eye, initial encounter: acute illness or injury    Amount and/or Complexity of Data Reviewed  Radiology: ordered and independent interpretation performed. Decision-making details documented in ED Course.     Details: CT brain interpreted by myself shows no acute intracranial process.        Condition upon leaving the department: Stable    Disposition and Plan     Clinical Impression:  1. Blunt trauma of right eye, initial encounter        Disposition:  Discharge    Follow-up:  Shay Huertas,   [id number] [facility] [address]  [phone]    Go today  the [place] office upon discharge from the ER.  intersection of [place] and [facility].      Medications Prescribed:  Current Discharge Medication List

## 2024-08-09 ENCOUNTER — OFFICE VISIT (OUTPATIENT)
Facility: LOCATION | Age: 64
End: 2024-08-09

## 2024-08-09 ENCOUNTER — LAB ENCOUNTER (OUTPATIENT)
Dept: LAB | Facility: REFERENCE LAB | Age: 64
End: 2024-08-09
Attending: INTERNAL MEDICINE
Payer: COMMERCIAL

## 2024-08-09 VITALS
WEIGHT: 275 LBS | BODY MASS INDEX: 39.37 KG/M2 | SYSTOLIC BLOOD PRESSURE: 134 MMHG | DIASTOLIC BLOOD PRESSURE: 77 MMHG | HEART RATE: 72 BPM | OXYGEN SATURATION: 96 % | HEIGHT: 70 IN

## 2024-08-09 DIAGNOSIS — Z13.228 SCREENING FOR ENDOCRINE, NUTRITIONAL, METABOLIC AND IMMUNITY DISORDER: ICD-10-CM

## 2024-08-09 DIAGNOSIS — I10 HYPERTENSION GOAL BP (BLOOD PRESSURE) < 130/80: ICD-10-CM

## 2024-08-09 DIAGNOSIS — E55.9 VITAMIN D DEFICIENCY: ICD-10-CM

## 2024-08-09 DIAGNOSIS — Z12.5 ENCOUNTER FOR SCREENING FOR MALIGNANT NEOPLASM OF PROSTATE: ICD-10-CM

## 2024-08-09 DIAGNOSIS — J45.20 INTERMITTENT ASTHMA WITHOUT COMPLICATION, UNSPECIFIED ASTHMA SEVERITY (HCC): ICD-10-CM

## 2024-08-09 DIAGNOSIS — M1A.9XX0 CHRONIC GOUT WITHOUT TOPHUS, UNSPECIFIED CAUSE, UNSPECIFIED SITE: ICD-10-CM

## 2024-08-09 DIAGNOSIS — J45.40 MODERATE PERSISTENT ASTHMA WITHOUT COMPLICATION (HCC): ICD-10-CM

## 2024-08-09 DIAGNOSIS — Z13.0 SCREENING FOR ENDOCRINE, NUTRITIONAL, METABOLIC AND IMMUNITY DISORDER: ICD-10-CM

## 2024-08-09 DIAGNOSIS — Z12.11 COLON CANCER SCREENING: ICD-10-CM

## 2024-08-09 DIAGNOSIS — E78.2 MIXED HYPERLIPIDEMIA: ICD-10-CM

## 2024-08-09 DIAGNOSIS — Z13.6 ENCOUNTER FOR SCREENING FOR CORONARY ARTERY DISEASE: ICD-10-CM

## 2024-08-09 DIAGNOSIS — Z13.29 SCREENING FOR ENDOCRINE, NUTRITIONAL, METABOLIC AND IMMUNITY DISORDER: ICD-10-CM

## 2024-08-09 DIAGNOSIS — K21.9 GASTROESOPHAGEAL REFLUX DISEASE WITHOUT ESOPHAGITIS: ICD-10-CM

## 2024-08-09 DIAGNOSIS — Z13.21 SCREENING FOR ENDOCRINE, NUTRITIONAL, METABOLIC AND IMMUNITY DISORDER: ICD-10-CM

## 2024-08-09 DIAGNOSIS — Z00.00 ANNUAL PHYSICAL EXAM: Primary | ICD-10-CM

## 2024-08-09 LAB
ALBUMIN SERPL-MCNC: 4.5 G/DL (ref 3.2–4.8)
ALBUMIN/GLOB SERPL: 1.7 {RATIO} (ref 1–2)
ALP LIVER SERPL-CCNC: 42 U/L
ALT SERPL-CCNC: 64 U/L
ANION GAP SERPL CALC-SCNC: 7 MMOL/L (ref 0–18)
AST SERPL-CCNC: 55 U/L (ref ?–34)
BILIRUB SERPL-MCNC: 0.6 MG/DL (ref 0.2–1.1)
BUN BLD-MCNC: 19 MG/DL (ref 9–23)
BUN/CREAT SERPL: 19.4 (ref 10–20)
CALCIUM BLD-MCNC: 9.7 MG/DL (ref 8.7–10.4)
CHLORIDE SERPL-SCNC: 103 MMOL/L (ref 98–112)
CHOLEST SERPL-MCNC: 111 MG/DL (ref ?–200)
CO2 SERPL-SCNC: 30 MMOL/L (ref 21–32)
COMPLEXED PSA SERPL-MCNC: 1.83 NG/ML (ref ?–4)
CREAT BLD-MCNC: 0.98 MG/DL
DEPRECATED RDW RBC AUTO: 41.9 FL (ref 35.1–46.3)
EGFRCR SERPLBLD CKD-EPI 2021: 87 ML/MIN/1.73M2 (ref 60–?)
ERYTHROCYTE [DISTWIDTH] IN BLOOD BY AUTOMATED COUNT: 12.7 % (ref 11–15)
EST. AVERAGE GLUCOSE BLD GHB EST-MCNC: 143 MG/DL (ref 68–126)
FASTING PATIENT LIPID ANSWER: NO
FASTING STATUS PATIENT QL REPORTED: NO
GLOBULIN PLAS-MCNC: 2.7 G/DL (ref 2–3.5)
GLUCOSE BLD-MCNC: 139 MG/DL (ref 70–99)
HBA1C MFR BLD: 6.6 % (ref ?–5.7)
HCT VFR BLD AUTO: 40.9 %
HDLC SERPL-MCNC: 36 MG/DL (ref 40–59)
HGB BLD-MCNC: 14.3 G/DL
LDLC SERPL CALC-MCNC: 45 MG/DL (ref ?–100)
MCH RBC QN AUTO: 31.7 PG (ref 26–34)
MCHC RBC AUTO-ENTMCNC: 35 G/DL (ref 31–37)
MCV RBC AUTO: 90.7 FL
NONHDLC SERPL-MCNC: 75 MG/DL (ref ?–130)
OSMOLALITY SERPL CALC.SUM OF ELEC: 295 MOSM/KG (ref 275–295)
PLATELET # BLD AUTO: 241 10(3)UL (ref 150–450)
POTASSIUM SERPL-SCNC: 3.5 MMOL/L (ref 3.5–5.1)
PROT SERPL-MCNC: 7.2 G/DL (ref 5.7–8.2)
RBC # BLD AUTO: 4.51 X10(6)UL
SODIUM SERPL-SCNC: 140 MMOL/L (ref 136–145)
TRIGL SERPL-MCNC: 179 MG/DL (ref 30–149)
TSI SER-ACNC: 1.77 MIU/ML (ref 0.55–4.78)
URATE SERPL-MCNC: 10.3 MG/DL
VIT D+METAB SERPL-MCNC: 45.6 NG/ML (ref 30–100)
VLDLC SERPL CALC-MCNC: 25 MG/DL (ref 0–30)
WBC # BLD AUTO: 9 X10(3) UL (ref 4–11)

## 2024-08-09 PROCEDURE — 36415 COLL VENOUS BLD VENIPUNCTURE: CPT | Performed by: INTERNAL MEDICINE

## 2024-08-09 PROCEDURE — 83036 HEMOGLOBIN GLYCOSYLATED A1C: CPT | Performed by: INTERNAL MEDICINE

## 2024-08-09 PROCEDURE — 80061 LIPID PANEL: CPT | Performed by: INTERNAL MEDICINE

## 2024-08-09 PROCEDURE — 82306 VITAMIN D 25 HYDROXY: CPT | Performed by: INTERNAL MEDICINE

## 2024-08-09 PROCEDURE — 99396 PREV VISIT EST AGE 40-64: CPT | Performed by: INTERNAL MEDICINE

## 2024-08-09 PROCEDURE — 84550 ASSAY OF BLOOD/URIC ACID: CPT

## 2024-08-09 PROCEDURE — 80053 COMPREHEN METABOLIC PANEL: CPT | Performed by: INTERNAL MEDICINE

## 2024-08-09 PROCEDURE — 85027 COMPLETE CBC AUTOMATED: CPT | Performed by: INTERNAL MEDICINE

## 2024-08-09 PROCEDURE — 84443 ASSAY THYROID STIM HORMONE: CPT | Performed by: INTERNAL MEDICINE

## 2024-08-09 RX ORDER — TELMISARTAN 80 MG/1
80 TABLET ORAL NIGHTLY
Qty: 90 TABLET | Refills: 9 | Status: SHIPPED | OUTPATIENT
Start: 2024-08-09

## 2024-08-09 RX ORDER — OMEPRAZOLE 20 MG/1
20 CAPSULE, DELAYED RELEASE ORAL EVERY MORNING
Qty: 90 CAPSULE | Refills: 6 | Status: SHIPPED | OUTPATIENT
Start: 2024-08-09

## 2024-08-09 RX ORDER — PREDNISONE 10 MG/1
TABLET ORAL
Qty: 18 TABLET | Refills: 2 | Status: SHIPPED | OUTPATIENT
Start: 2024-08-09 | End: 2024-08-18

## 2024-08-09 RX ORDER — CHLORTHALIDONE 25 MG/1
25 TABLET ORAL DAILY
Qty: 90 TABLET | Refills: 9 | Status: SHIPPED | OUTPATIENT
Start: 2024-08-09

## 2024-08-09 RX ORDER — ROSUVASTATIN CALCIUM 5 MG/1
5 TABLET, COATED ORAL NIGHTLY
Qty: 90 TABLET | Refills: 9 | Status: SHIPPED | OUTPATIENT
Start: 2024-08-09

## 2024-08-09 RX ORDER — NEBIVOLOL 10 MG/1
10 TABLET ORAL DAILY
Qty: 90 TABLET | Refills: 9 | Status: SHIPPED | OUTPATIENT
Start: 2024-08-09

## 2024-08-09 RX ORDER — ALBUTEROL SULFATE 90 UG/1
2 AEROSOL, METERED RESPIRATORY (INHALATION) EVERY 4 HOURS PRN
Qty: 3 EACH | Refills: 5 | Status: SHIPPED | OUTPATIENT
Start: 2024-08-09

## 2024-08-09 NOTE — PROGRESS NOTES
INTERNAL MEDICINE ANNUAL EXAM NOTE     Patient ID: Andres Kang is a 64 year old male.  Chief Complaint: Physical      Andres Kang is a pleasant 64 year old male who presents for annual physical exam. Andres Kang is doing well today.  Right eye damage- had retinal tear- had surgery tear. Vision is stable, still with some floaters.   2. Uric acid elevated 9.7, 2 flares this year. Not on allopurinol.       Health Maintenance  - All care gaps addressed with patient.     Review of Systems  Review of Systems   Constitutional:  Negative for unexpected weight change.   HENT:  Negative for hearing loss.    Eyes:  Negative for pain and visual disturbance.   Respiratory:  Negative for shortness of breath.    Cardiovascular:  Negative for chest pain, palpitations and leg swelling.   Gastrointestinal:  Negative for abdominal pain and blood in stool.   Genitourinary:  Negative for difficulty urinating and hematuria.   Neurological:  Negative for tremors and syncope.   Psychiatric/Behavioral: Negative.         Physical Exam  Vitals:    08/09/24 0811   BP: 134/77   Pulse: 72   SpO2: 96%   Weight: 275 lb (124.7 kg)   Height: 5' 10\" (1.778 m)     Body mass index is 39.46 kg/m².  BP Readings from Last 3 Encounters:   08/09/24 134/77   07/25/24 128/67   03/22/24 129/72     Physical Exam  Vitals and nursing note reviewed.   Constitutional:       General: He is not in acute distress.     Appearance: Normal appearance.   HENT:      Head: Normocephalic.      Right Ear: External ear normal.      Left Ear: External ear normal.   Eyes:      Extraocular Movements: Extraocular movements intact.      Conjunctiva/sclera: Conjunctivae normal.   Cardiovascular:      Rate and Rhythm: Normal rate and regular rhythm.      Pulses: Normal pulses.      Heart sounds: Normal heart sounds.   Pulmonary:      Effort: Pulmonary effort is normal. No respiratory distress.      Breath sounds: Normal breath sounds. No wheezing.   Abdominal:       General: Abdomen is flat. Bowel sounds are normal.      Tenderness: There is no abdominal tenderness.   Musculoskeletal:         General: Normal range of motion.      Cervical back: Normal range of motion and neck supple.   Skin:     Coloration: Skin is not jaundiced.   Neurological:      General: No focal deficit present.      Mental Status: He is alert and oriented to person, place, and time. Mental status is at baseline.   Psychiatric:         Mood and Affect: Mood normal.         Behavior: Behavior normal.           Labs & Imaging  Pertinent labs and imaging reviewed.   Lab Results   Component Value Date     (H) 03/22/2024    BUN 19 03/22/2024    BUNCREA 16.7 03/22/2024    CREATSERUM 1.14 03/22/2024    ANIONGAP 8 03/22/2024    GFRNAA 83 07/18/2022    GFRAA 96 07/18/2022    CA 9.5 03/22/2024    OSMOCALC 298 (H) 03/22/2024    ALKPHO 42 (L) 07/21/2023    AST 15 07/21/2023    ALT 33 07/21/2023    ALKPHOS 30 (L) 10/10/2014    BILT 0.6 07/21/2023    TP 7.1 07/21/2023    ALB 3.6 07/21/2023    GLOBULIN 3.5 07/21/2023    AGRATIO 1.1 10/10/2014     03/22/2024    K 4.1 03/22/2024     03/22/2024    CO2 29.0 03/22/2024     Lab Results   Component Value Date     (H) 07/21/2023    A1C 6.1 (H) 07/21/2023     Lab Results   Component Value Date    WBC 11.3 (H) 07/21/2023    RBC 4.38 07/21/2023    HGB 13.3 07/21/2023    HCT 40.1 07/21/2023    MCV 91.6 07/21/2023    MCH 30.4 07/21/2023    MCHC 33.2 07/21/2023    RDW 13.0 07/21/2023    .0 07/21/2023    MPV 10.0 01/26/2018     Lab Results   Component Value Date    CHOLEST 107 07/21/2023    TRIG 116 07/21/2023    HDL 46 07/21/2023    LDL 40 07/21/2023    VLDL 16 07/21/2023    NONHDLC 61 07/21/2023    CALCNONHDL 115 10/10/2014     The ASCVD Risk score (Joseph BRYANT, et al., 2019) failed to calculate for the following reasons:    The valid total cholesterol range is 130 to 320 mg/dL    Medical History    Reviewed allergies:  No Known Allergies      Reviewed:  Patient Active Problem List    Diagnosis    Umbilical hernia with obstruction, without gangrene    Mixed hyperlipidemia    Severe obesity (BMI 35.0-35.9 with comorbidity) (HCC)    FAY on CPAP     CPAP      Hypertension goal BP (blood pressure) < 130/80    Allergic rhinitis    GERD (gastroesophageal reflux disease)      Reviewed:  Past Medical History:    Asthma (HCC)    Esophageal reflux    Essential hypertension    Extrinsic asthma, unspecified    Gastritis    EGD with Biopsy    Hx of hernia repair    Other and unspecified hyperlipidemia    Seasonal allergies    Sleep apnea    Visual impairment      Reviewed:  Family History   Problem Relation Age of Onset    Other (MVA[other]) Father 42        MVA; Cause of death       Reviewed:  Past Surgical History:   Procedure Laterality Date    Colonoscopy  01/01/2010    Inguinal hernia repair Right     Late 1980's to early 1990's per patient    Umbilical hernia repair  10/05/2023    Laparoscopic repair of incarcerated umbilical hernia with mesh    Upper gi endoscopy,biopsy  01/01/2010    EGD with Biopsy      Reviewed:  Social History     Socioeconomic History    Marital status:    Tobacco Use    Smoking status: Never     Passive exposure: Never    Smokeless tobacco: Never   Vaping Use    Vaping status: Never Used   Substance and Sexual Activity    Alcohol use: Yes     Alcohol/week: 1.0 standard drink of alcohol     Types: 1 Standard drinks or equivalent per week     Comment: social    Drug use: No   Other Topics Concern    Caffeine Concern Yes     Comment: Coffee, Soda 3 cups daily;       Reviewed:  Current Outpatient Medications   Medication Sig Dispense Refill    chlorthalidone 25 MG Oral Tab Take 1 tablet (25 mg total) by mouth daily. FOR BLOOD PRESSURE. 90 tablet 9    nebivolol (BYSTOLIC) 10 MG Oral Tab Take 1 tablet (10 mg total) by mouth daily. FOR BLOOD PRESSURE. 90 tablet 9    rosuvastatin 5 MG Oral Tab Take 1 tablet (5 mg total) by mouth nightly.  FOR CHOLESTEROL. 90 tablet 9    telmisartan 80 MG Oral Tab Take 1 tablet (80 mg total) by mouth at bedtime. FOR BLOOD PRESSURE. 90 tablet 9    omeprazole 20 MG Oral Capsule Delayed Release Take 1 capsule (20 mg total) by mouth every morning. FOR ACID REFLUX. 90 capsule 6    albuterol 108 (90 Base) MCG/ACT Inhalation Aero Soln Inhale 2 puffs into the lungs every 4 (four) hours as needed for Wheezing or Shortness of Breath. 3 each 5    predniSONE 10 MG Oral Tab Take 3 tablets (30 mg total) by mouth daily for 3 days, THEN 2 tablets (20 mg total) daily for 3 days, THEN 1 tablet (10 mg total) daily for 3 days. TAKE WITH FOOD FOR GOUT FLARE.. 18 tablet 2    HYDROcodone-acetaminophen (NORCO)  MG Oral Tab Take 1 tablet by mouth every 6 (six) hours as needed for Pain. 10 tablet 0    fluticasone-salmeterol (ADVAIR DISKUS) 250-50 MCG/ACT Inhalation Aerosol Powder, Breath Activated Inhale 1 puff into the lungs every 12 (twelve) hours. 3 each 9          Assessment & Plan    1. Annual physical exam  - Comp Metabolic Panel (14)  - Hemoglobin A1C  - CBC, Platelet; No Differential  - Lipid Panel  - TSH W Reflex To Free T4    2. Hypertension goal BP (blood pressure) < 130/80  - chlorthalidone 25 MG Oral Tab; Take 1 tablet (25 mg total) by mouth daily. FOR BLOOD PRESSURE.  Dispense: 90 tablet; Refill: 9  - nebivolol (BYSTOLIC) 10 MG Oral Tab; Take 1 tablet (10 mg total) by mouth daily. FOR BLOOD PRESSURE.  Dispense: 90 tablet; Refill: 9  - telmisartan 80 MG Oral Tab; Take 1 tablet (80 mg total) by mouth at bedtime. FOR BLOOD PRESSURE.  Dispense: 90 tablet; Refill: 9    3. Mixed hyperlipidemia  - rosuvastatin 5 MG Oral Tab; Take 1 tablet (5 mg total) by mouth nightly. FOR CHOLESTEROL.  Dispense: 90 tablet; Refill: 9    4. Gastroesophageal reflux disease without esophagitis  - omeprazole 20 MG Oral Capsule Delayed Release; Take 1 capsule (20 mg total) by mouth every morning. FOR ACID REFLUX.  Dispense: 90 capsule; Refill: 6    5.  Moderate persistent asthma without complication (HCC)    6. Intermittent asthma without complication, unspecified asthma severity (HCC)  - albuterol 108 (90 Base) MCG/ACT Inhalation Aero Soln; Inhale 2 puffs into the lungs every 4 (four) hours as needed for Wheezing or Shortness of Breath.  Dispense: 3 each; Refill: 5    7. Screening for endocrine, nutritional, metabolic and immunity disorder  - Comp Metabolic Panel (14)  - Hemoglobin A1C  - CBC, Platelet; No Differential  - Lipid Panel  - TSH W Reflex To Free T4  - Vitamin D    8. Vitamin D deficiency  - Vitamin D    9. Encounter for screening for malignant neoplasm of prostate  - PSA Total, Screen    10. Colon cancer screening  - [facility] GI Telephone Colon Screen  - GASTRO - INTERNAL    11. Chronic gout without tophus, unspecified cause, unspecified site  - Uric Acid; Future  - predniSONE 10 MG Oral Tab; Take 3 tablets (30 mg total) by mouth daily for 3 days, THEN 2 tablets (20 mg total) daily for 3 days, THEN 1 tablet (10 mg total) daily for 3 days. TAKE WITH FOOD FOR GOUT FLARE..  Dispense: 18 tablet; Refill: 2    12. Encounter for screening for coronary artery disease  - CT CALCIUM SCORING; Future  Plan  Overall doing well today. Screening labs, preventive imaging/procedure, and health care gaps addressed as per USPSTF guidelines, orders available electronically via [place] and in AVS.  Vaccines discussed and administered depending on availability and per patient preference; patient to return for any outstanding vaccines once available.  Patient brought to  to help schedule care gaps and follow up. Further recommendations depending on lab results.    Monitor gout and asthma.         Follow Up:   Return for 1 YEAR FOR ANNUAL OR DEPENDING ON LAB RESULTS.      Mauricio Geiger MD  Internal Medicine      Patient asked to sign release of information for outside records if not already requested, make future office/imaging appointments at the  prior to  leaving, and to sign up for [facility] if not already active.  Preventive measures and further education discussed with patient as per after visit summary. Potential medication side effects discussed. All questions answered to best of ability.   Call office with any questions. Seek emergency care if necessary.   Patient understands and agrees to follow directions and advice.      ----------------------------------------- PATIENT INSTRUCTIONS-----------------------------------------     There are no Patient Instructions on file for this visit.

## 2024-08-13 ENCOUNTER — TELEPHONE (OUTPATIENT)
Facility: LOCATION | Age: 64
End: 2024-08-13

## 2024-08-13 ENCOUNTER — TELEMEDICINE (OUTPATIENT)
Facility: LOCATION | Age: 64
End: 2024-08-13

## 2024-08-13 DIAGNOSIS — E11.59 HYPERTENSION ASSOCIATED WITH TYPE 2 DIABETES MELLITUS (HCC): ICD-10-CM

## 2024-08-13 DIAGNOSIS — E78.5 HYPERLIPIDEMIA ASSOCIATED WITH TYPE 2 DIABETES MELLITUS (HCC): ICD-10-CM

## 2024-08-13 DIAGNOSIS — I15.2 HYPERTENSION ASSOCIATED WITH TYPE 2 DIABETES MELLITUS (HCC): ICD-10-CM

## 2024-08-13 DIAGNOSIS — M1A.9XX0 CHRONIC GOUT WITHOUT TOPHUS, UNSPECIFIED CAUSE, UNSPECIFIED SITE: ICD-10-CM

## 2024-08-13 DIAGNOSIS — E11.9 TYPE 2 DIABETES MELLITUS WITHOUT COMPLICATION, WITHOUT LONG-TERM CURRENT USE OF INSULIN (HCC): Primary | ICD-10-CM

## 2024-08-13 DIAGNOSIS — E11.69 HYPERLIPIDEMIA ASSOCIATED WITH TYPE 2 DIABETES MELLITUS (HCC): ICD-10-CM

## 2024-08-13 PROBLEM — K42.0 UMBILICAL HERNIA WITH OBSTRUCTION, WITHOUT GANGRENE: Status: RESOLVED | Noted: 2023-09-08 | Resolved: 2024-08-13

## 2024-08-13 PROBLEM — E78.2 MIXED HYPERLIPIDEMIA: Status: RESOLVED | Noted: 2021-07-16 | Resolved: 2024-08-13

## 2024-08-13 PROBLEM — E66.01 SEVERE OBESITY (BMI 35.0-35.9 WITH COMORBIDITY) (HCC): Status: RESOLVED | Noted: 2018-01-26 | Resolved: 2024-08-13

## 2024-08-13 PROCEDURE — 99214 OFFICE O/P EST MOD 30 MIN: CPT | Performed by: INTERNAL MEDICINE

## 2024-08-13 RX ORDER — PREDNISONE 10 MG/1
TABLET ORAL
Qty: 18 TABLET | Refills: 0 | Status: SHIPPED | OUTPATIENT
Start: 2024-08-13 | End: 2024-08-22

## 2024-08-13 RX ORDER — TIRZEPATIDE 2.5 MG/.5ML
2.5 INJECTION, SOLUTION SUBCUTANEOUS WEEKLY
Qty: 2 ML | Refills: 1 | Status: SHIPPED | OUTPATIENT
Start: 2024-08-13

## 2024-08-13 RX ORDER — TELMISARTAN 80 MG/1
80 TABLET ORAL NIGHTLY
Qty: 90 TABLET | Refills: 9 | Status: SHIPPED | OUTPATIENT
Start: 2024-08-13

## 2024-08-13 RX ORDER — ROSUVASTATIN CALCIUM 5 MG/1
5 TABLET, COATED ORAL NIGHTLY
Qty: 90 TABLET | Refills: 9 | Status: SHIPPED | OUTPATIENT
Start: 2024-08-13

## 2024-08-13 RX ORDER — ALLOPURINOL 100 MG/1
100 TABLET ORAL DAILY
Qty: 90 TABLET | Refills: 5 | Status: SHIPPED | OUTPATIENT
Start: 2024-08-13

## 2024-08-13 NOTE — PATIENT INSTRUCTIONS
Frequently asked questions about GLP-1 Medications- ZEPBOUND/WEGOVY  Eat smaller, more frequent, low carb meals to reduce side effects. In other words: eat like a diabetic! Mediterranean diet is excellent. For each meal eat fat and proteins first, then any remaining carbs- this reduces blood sugar spikes and weight gain.   IF you do not eat like a diabetic you will not lose weight and will have significant stomach side effects.  You must perform weight lifting exercises at least 3-4 times per week to ensure you build muscle. Slow repetitions to avoid injury and aim for 20-30 minutes per session of activity.   These medications make you lose fat AND muscle. The only way to come off these medications is to replace your fat with muscle. It's not enough to just lose fat. Studies consistently show weight lifting can cure diabetes, cardiovascular exercise like walking DOES NOT. The same concept applies to weight loss.   You should eat 30-40 grams of protein daily- LEVELS protein is excellent- in addition to your meals. Can be found on [employer]. If you see a kidney specialist then please discuss your protein intake with them.  Every 4 injections we should aim to increase the dose of the medication UNLESS you are having side effects or you are continuing to lose weight, then we stay at this dose for a total of 8 doses and then we re-challenge with the higher dose.  If you are losing weight but not having side effects you may remain on your current dose until your weight loss plateus, then we increase the dose.   If you are NOT losing weight AND are having side effects, continue your current dose for a total of 8 doses before we increase;  if symptoms are severe stop medication and contact my office.   You may remain on any dose indefinitely and have positive effects, but you should NOT stay on a dose that is not providing any benefit (weight loss) nor side effects (nausea/ upset stomach).   Please see me back via video  visit for any questions or concerns related to increasing your dose, typically every 4-8 weeks.   Our plan is to have 1 year of medication or less!

## 2024-08-13 NOTE — TELEPHONE ENCOUNTER
Patient called and will like to discuss the medication and know what the plan is. Patient was given a video appointment to discuss more of the medication  will like for patient to try. Patient was given a video appointment for today.     Future Appointments   Date Time Provider Department Center   8/13/2024  1:40 PM Mauricio Geiger MD [facility]              Labs are overall stable, A1c did come up a little, it does count as diabetes, if you want mounjaro let me know, it helps with diabetes and weight loss. Uric acid is also elevated, I think it's a good idea to go on some allopurinol for 3-6 months, get that uric acid below 6, then stop the allopurinol- we can do this now (and ill give you some steroid in case it flares) or you can start this if you have another flare. Let me know. -drkp   Written by Mauricio Geiger MD on 8/10/2024  8:14 AM CDT  Seen by patient Andres KATIE Pearl on 8/13/2024  5:24 AM

## 2024-08-13 NOTE — PROGRESS NOTES
INTERNAL MEDICINE OFFICE NOTE     Patient ID: Andres Kang is a 64 year old male.  Today's Date: 08/13/24  Chief Complaint: No chief complaint on file.    HPI  1.  He has gout, his most recent uric acid is up to 10.3, has had numerous flares, he is agreeable to starting allopurinol, we did discuss that the chlorthalidone may be causing this to be a dehydration however we did have to work to get his blood pressure controlled and this is one of the medications that helped, if he continues to have flares of the allopurinol does not help we will consider alternatives such as spironolactone.    2.  He has new onset type 2 diabetes, A1c is 6.6, BMI of 39, we discussed the standard guideline therapies for the treatment of type 2 diabetes especially in those with comorbidities such as obesity and sleep apnea.  We discussed Mounjaro.    3.  He has dyslipidemia associated type 2 diabetes, LDL well-controlled rosuvastatin, no complaints.    4.  He has hypertension associated type 2 diabetes, blood pressure well-controlled, no complaints.    5.  His right eye is doing better, he will be seeing ophthal today.        There were no vitals filed for this visit.  body mass index is unknown because there is no height or weight on file.  BP Readings from Last 3 Encounters:   08/09/24 134/77   07/25/24 128/67   03/22/24 129/72     The ASCVD Risk score (Joseph BRYANT, et al., 2019) failed to calculate for the following reasons:    The valid total cholesterol range is 130 to 320 mg/dL  Medications reviewed:  Current Outpatient Medications   Medication Sig Dispense Refill    Tirzepatide (MOUNJARO) 2.5 MG/0.5ML Subcutaneous Solution Pen-injector Inject 2.5 mg into the skin once a week. IF DOING WELL AFTER 4 DOSES THEN INCREASE TO 5 MG / WEEK. 2 mL 1    telmisartan 80 MG Oral Tab Take 1 tablet (80 mg total) by mouth at bedtime. FOR BLOOD PRESSURE. 90 tablet 9    rosuvastatin 5 MG Oral Tab Take 1 tablet (5 mg total) by mouth  nightly. FOR CHOLESTEROL. 90 tablet 9    allopurinol 100 MG Oral Tab Take 1 tablet (100 mg total) by mouth daily. 90 tablet 5    predniSONE 10 MG Oral Tab Take 3 tablets (30 mg total) by mouth daily for 3 days, THEN 2 tablets (20 mg total) daily for 3 days, THEN 1 tablet (10 mg total) daily for 3 days. TAKE WITH FOOD IF YOUR GOUT FLARES WHEN YOU START ALLOPURINOL.. 18 tablet 0    chlorthalidone 25 MG Oral Tab Take 1 tablet (25 mg total) by mouth daily. FOR BLOOD PRESSURE. 90 tablet 9    nebivolol (BYSTOLIC) 10 MG Oral Tab Take 1 tablet (10 mg total) by mouth daily. FOR BLOOD PRESSURE. 90 tablet 9    omeprazole 20 MG Oral Capsule Delayed Release Take 1 capsule (20 mg total) by mouth every morning. FOR ACID REFLUX. 90 capsule 6    albuterol 108 (90 Base) MCG/ACT Inhalation Aero Soln Inhale 2 puffs into the lungs every 4 (four) hours as needed for Wheezing or Shortness of Breath. 3 each 5    HYDROcodone-acetaminophen (NORCO)  MG Oral Tab Take 1 tablet by mouth every 6 (six) hours as needed for Pain. 10 tablet 0    fluticasone-salmeterol (ADVAIR DISKUS) 250-50 MCG/ACT Inhalation Aerosol Powder, Breath Activated Inhale 1 puff into the lungs every 12 (twelve) hours. 3 each 9         Assessment & Plan    1. Type 2 diabetes mellitus without complication, without long-term current use of insulin (HCC) (Primary)  -     Mounjaro; Inject 2.5 mg into the skin once a week. IF DOING WELL AFTER 4 DOSES THEN INCREASE TO 5 MG / WEEK.  Dispense: 2 mL; Refill: 1  -     Telmisartan; Take 1 tablet (80 mg total) by mouth at bedtime. FOR BLOOD PRESSURE.  Dispense: 90 tablet; Refill: 9  -     Rosuvastatin Calcium; Take 1 tablet (5 mg total) by mouth nightly. FOR CHOLESTEROL.  Dispense: 90 tablet; Refill: 9  2. Hypertension associated with type 2 diabetes mellitus (HCC)  -     Telmisartan; Take 1 tablet (80 mg total) by mouth at bedtime. FOR BLOOD PRESSURE.  Dispense: 90 tablet; Refill: 9  3. Hyperlipidemia associated with type 2  diabetes mellitus (HCC)  -     Rosuvastatin Calcium; Take 1 tablet (5 mg total) by mouth nightly. FOR CHOLESTEROL.  Dispense: 90 tablet; Refill: 9  4. Chronic gout without tophus, unspecified cause, unspecified site  -     Allopurinol; Take 1 tablet (100 mg total) by mouth daily.  Dispense: 90 tablet; Refill: 5  -     Uric Acid; Standing  -     predniSONE; Take 3 tablets (30 mg total) by mouth daily for 3 days, THEN 2 tablets (20 mg total) daily for 3 days, THEN 1 tablet (10 mg total) daily for 3 days. TAKE WITH FOOD IF YOUR GOUT FLARES WHEN YOU START ALLOPURINOL..  Dispense: 18 tablet; Refill: 0  Plan  Start Mounjaro, continue telmisartan, continue rosuvastatin.  In terms of the gout start allopurinol, if he has a flare then add in prednisone, recheck uric acid monthly until less than 6 and then consider stopping allopurinol as he would like to reduce the need for meds.  Patient advised that if he continues to have flares, uric acid remains elevated or at the end of this he has recurrence of gout we will switch the chlorthalidone to something else.    Follow Up: Return in about 4 weeks (around 9/10/2024) for gout/mounjaro, OFFICE OR VIDEO VISIT-..         Objective: Results:   Physical Exam   Reviewed:    Patient Active Problem List    Diagnosis    Hyperlipidemia associated with type 2 diabetes mellitus (HCC)    Type 2 diabetes mellitus without complication, without long-term current use of insulin (HCC)    Chronic gout without tophus    FAY on CPAP     CPAP      Hypertension associated with type 2 diabetes mellitus (HCC)    GERD (gastroesophageal reflux disease)      No Known Allergies     Social History     Socioeconomic History    Marital status:    Tobacco Use    Smoking status: Never     Passive exposure: Never    Smokeless tobacco: Never   Vaping Use    Vaping status: Never Used   Substance and Sexual Activity    Alcohol use: Yes     Alcohol/week: 1.0 standard drink of alcohol     Types: 1 Standard  drinks or equivalent per week     Comment: social    Drug use: No   Other Topics Concern    Caffeine Concern Yes     Comment: Coffee, Soda 3 cups daily;       Review of Systems  All other systems negative unless otherwise stated in ROS or HPI above.       Mauricio Geiger MD  Internal Medicine       Call office with any questions or seek emergency care if necessary.   Patient understands and agrees to follow directions and advice.      ----------------------------------------- PATIENT INSTRUCTIONS-----------------------------------------   .    Patient Instructions   Frequently asked questions about GLP-1 Medications- ZEPBOUND/WEGOVY  Eat smaller, more frequent, low carb meals to reduce side effects. In other words: eat like a diabetic! Mediterranean diet is excellent. For each meal eat fat and proteins first, then any remaining carbs- this reduces blood sugar spikes and weight gain.   IF you do not eat like a diabetic you will not lose weight and will have significant stomach side effects.  You must perform weight lifting exercises at least 3-4 times per week to ensure you build muscle. Slow repetitions to avoid injury and aim for 20-30 minutes per session of activity.   These medications make you lose fat AND muscle. The only way to come off these medications is to replace your fat with muscle. It's not enough to just lose fat. Studies consistently show weight lifting can cure diabetes, cardiovascular exercise like walking DOES NOT. The same concept applies to weight loss.   You should eat 30-40 grams of protein daily- LEVELS protein is excellent- in addition to your meals. Can be found on [employer]. If you see a kidney specialist then please discuss your protein intake with them.  Every 4 injections we should aim to increase the dose of the medication UNLESS you are having side effects or you are continuing to lose weight, then we stay at this dose for a total of 8 doses and then we re-challenge with the higher  dose.  If you are losing weight but not having side effects you may remain on your current dose until your weight loss plateus, then we increase the dose.   If you are NOT losing weight AND are having side effects, continue your current dose for a total of 8 doses before we increase;  if symptoms are severe stop medication and contact my office.   You may remain on any dose indefinitely and have positive effects, but you should NOT stay on a dose that is not providing any benefit (weight loss) nor side effects (nausea/ upset stomach).   Please see me back via video visit for any questions or concerns related to increasing your dose, typically every 4-8 weeks.   Our plan is to have 1 year of medication or less!

## 2024-09-09 ENCOUNTER — LAB ENCOUNTER (OUTPATIENT)
Dept: LAB | Age: 64
End: 2024-09-09
Attending: INTERNAL MEDICINE
Payer: COMMERCIAL

## 2024-09-09 DIAGNOSIS — M1A.9XX0 CHRONIC GOUT WITHOUT TOPHUS, UNSPECIFIED CAUSE, UNSPECIFIED SITE: ICD-10-CM

## 2024-09-09 LAB — URATE SERPL-MCNC: 7.4 MG/DL

## 2024-09-09 PROCEDURE — 36415 COLL VENOUS BLD VENIPUNCTURE: CPT

## 2024-09-09 PROCEDURE — 84550 ASSAY OF BLOOD/URIC ACID: CPT

## 2024-09-12 ENCOUNTER — OFFICE VISIT (OUTPATIENT)
Facility: LOCATION | Age: 64
End: 2024-09-12

## 2024-09-12 VITALS
DIASTOLIC BLOOD PRESSURE: 78 MMHG | HEIGHT: 70 IN | WEIGHT: 269.81 LBS | OXYGEN SATURATION: 97 % | HEART RATE: 65 BPM | BODY MASS INDEX: 38.63 KG/M2 | SYSTOLIC BLOOD PRESSURE: 146 MMHG

## 2024-09-12 DIAGNOSIS — E11.59 HYPERTENSION ASSOCIATED WITH TYPE 2 DIABETES MELLITUS (HCC): ICD-10-CM

## 2024-09-12 DIAGNOSIS — M1A.9XX0 CHRONIC GOUT WITHOUT TOPHUS, UNSPECIFIED CAUSE, UNSPECIFIED SITE: ICD-10-CM

## 2024-09-12 DIAGNOSIS — I15.2 HYPERTENSION ASSOCIATED WITH TYPE 2 DIABETES MELLITUS (HCC): ICD-10-CM

## 2024-09-12 DIAGNOSIS — E11.9 TYPE 2 DIABETES MELLITUS WITHOUT COMPLICATION, WITHOUT LONG-TERM CURRENT USE OF INSULIN (HCC): Primary | ICD-10-CM

## 2024-09-12 PROCEDURE — 99214 OFFICE O/P EST MOD 30 MIN: CPT | Performed by: INTERNAL MEDICINE

## 2024-09-12 RX ORDER — TIRZEPATIDE 10 MG/.5ML
10 INJECTION, SOLUTION SUBCUTANEOUS WEEKLY
Qty: 2 ML | Refills: 1 | Status: SHIPPED | OUTPATIENT
Start: 2024-09-12

## 2024-09-12 RX ORDER — TIRZEPATIDE 5 MG/.5ML
5 INJECTION, SOLUTION SUBCUTANEOUS WEEKLY
Qty: 2 ML | Refills: 1 | Status: SHIPPED | OUTPATIENT
Start: 2024-09-12 | End: 2024-09-12

## 2024-09-12 RX ORDER — TIRZEPATIDE 5 MG/.5ML
5 INJECTION, SOLUTION SUBCUTANEOUS WEEKLY
Qty: 2 ML | Refills: 1 | Status: SHIPPED | OUTPATIENT
Start: 2024-09-12

## 2024-09-12 NOTE — PROGRESS NOTES
INTERNAL MEDICINE OFFICE NOTE     Patient ID: Andres Kang is a 64 year old male.  Today's Date: 09/12/24  Chief Complaint: Follow - Up (Medications )    HPI  1.  He has gout, his uric acid on allopurinol come down to 7.4, tolerating without any side effects.  No complaints.  2.  He has type 2 diabetes, his last A1c was 6.6, he has been starting Mounjaro, he took 4 dosages of the 2.5 mg and he is overall doing well, he is lost about 5 pounds, would like to go up on the dose.  3.  [facility], he will send over diabetes eye exam results.      Vitals:    09/12/24 0931   BP: 146/78   Pulse: 65   SpO2: 97%   Weight: 269 lb 12.8 oz (122.4 kg)   Height: 5' 10\" (1.778 m)     body mass index is 38.71 kg/m².  BP Readings from Last 3 Encounters:   09/12/24 146/78   08/09/24 134/77   07/25/24 128/67     The ASCVD Risk score (Joseph BRYANT, et al., 2019) failed to calculate for the following reasons:    The valid total cholesterol range is 130 to 320 mg/dL  Medications reviewed:  Current Outpatient Medications   Medication Sig Dispense Refill    Tirzepatide (MOUNJARO) 10 MG/0.5ML Subcutaneous Solution Pen-injector Inject 10 mg into the skin once a week. IF DOING WELL AFTER 4 DOSES THEN INCREASE TO 12.5 MG / WEEK. 2 mL 1    Tirzepatide (MOUNJARO) 5 MG/0.5ML Subcutaneous Solution Pen-injector Inject 5 mg into the skin once a week. IF DOING WELL AFTER 4 DOSES THEN INCREASE TO 10 MG / WEEK. 2 mL 1    telmisartan 80 MG Oral Tab Take 1 tablet (80 mg total) by mouth at bedtime. FOR BLOOD PRESSURE. 90 tablet 9    rosuvastatin 5 MG Oral Tab Take 1 tablet (5 mg total) by mouth nightly. FOR CHOLESTEROL. 90 tablet 9    allopurinol 100 MG Oral Tab Take 1 tablet (100 mg total) by mouth daily. 90 tablet 5    chlorthalidone 25 MG Oral Tab Take 1 tablet (25 mg total) by mouth daily. FOR BLOOD PRESSURE. 90 tablet 9    nebivolol (BYSTOLIC) 10 MG Oral Tab Take 1 tablet (10 mg total) by mouth daily. FOR BLOOD  PRESSURE. 90 tablet 9    omeprazole 20 MG Oral Capsule Delayed Release Take 1 capsule (20 mg total) by mouth every morning. FOR ACID REFLUX. 90 capsule 6    albuterol 108 (90 Base) MCG/ACT Inhalation Aero Soln Inhale 2 puffs into the lungs every 4 (four) hours as needed for Wheezing or Shortness of Breath. 3 each 5    HYDROcodone-acetaminophen (NORCO)  MG Oral Tab Take 1 tablet by mouth every 6 (six) hours as needed for Pain. 10 tablet 0    fluticasone-salmeterol (ADVAIR DISKUS) 250-50 MCG/ACT Inhalation Aerosol Powder, Breath Activated Inhale 1 puff into the lungs every 12 (twelve) hours. 3 each 9         Assessment & Plan    1. Type 2 diabetes mellitus without complication, without long-term current use of insulin (HCC) (Primary)  -     Discontinue: Mounjaro; Inject 5 mg into the skin once a week. IF DOING WELL AFTER 4 DOSES THEN INCREASE TO 7.5 MG / WEEK.  Dispense: 2 mL; Refill: 1  -     Mounjaro; Inject 10 mg into the skin once a week. IF DOING WELL AFTER 4 DOSES THEN INCREASE TO 12.5 MG / WEEK.  Dispense: 2 mL; Refill: 1  -     Hemoglobin A1C; Future; Expected date: 11/11/2024  -     Comp Metabolic Panel (14); Future; Expected date: 11/11/2024  -     Lipid Panel; Future; Expected date: 11/11/2024  -     Mounjaro; Inject 5 mg into the skin once a week. IF DOING WELL AFTER 4 DOSES THEN INCREASE TO 10 MG / WEEK.  Dispense: 2 mL; Refill: 1  2. Chronic gout without tophus, unspecified cause, unspecified site  -     Uric Acid; Future; Expected date: 11/11/2024  3. Hypertension associated with type 2 diabetes mellitus (HCC)  Plan  Increase Mounjaro to 5 mg, if he does well then go to 10 mg unless he is able to find the 7.5 mg.  Repeat labs prior to follow-up visit in about 2 months.  Continue allopurinol his uric acid is improving.    Follow Up: Return in about 2 months (around 11/12/2024) for DIABETES/gout, GET LABS DONE 3 DAYS BEFORE VISIT...         Objective: Results:   Physical Exam  Vitals and nursing  note reviewed.   Constitutional:       General: He is not in acute distress.     Appearance: Normal appearance.   HENT:      Head: Normocephalic.      Right Ear: External ear normal.      Left Ear: External ear normal.   Eyes:      Extraocular Movements: Extraocular movements intact.      Conjunctiva/sclera: Conjunctivae normal.   Pulmonary:      Effort: Pulmonary effort is normal.   Musculoskeletal:         General: Normal range of motion.      Cervical back: Normal range of motion and neck supple.   Skin:     Coloration: Skin is not jaundiced.   Neurological:      General: No focal deficit present.      Mental Status: He is alert and oriented to person, place, and time. Mental status is at baseline.   Psychiatric:         Mood and Affect: Mood normal.         Behavior: Behavior normal.        Reviewed:    Patient Active Problem List    Diagnosis    Hyperlipidemia associated with type 2 diabetes mellitus (HCC)    Type 2 diabetes mellitus without complication, without long-term current use of insulin (HCC)    Chronic gout without tophus    FAY on CPAP     CPAP      Hypertension associated with type 2 diabetes mellitus (HCC)    GERD (gastroesophageal reflux disease)      No Known Allergies     Social History     Socioeconomic History    Marital status:    Tobacco Use    Smoking status: Never     Passive exposure: Never    Smokeless tobacco: Never   Vaping Use    Vaping status: Never Used   Substance and Sexual Activity    Alcohol use: Yes     Alcohol/week: 1.0 standard drink of alcohol     Types: 1 Standard drinks or equivalent per week     Comment: social    Drug use: No   Other Topics Concern    Caffeine Concern Yes     Comment: Coffee, Soda 3 cups daily;       Review of Systems  All other systems negative unless otherwise stated in ROS or HPI above.       Mauricio Geiger MD  Internal Medicine       Call office with any questions or seek emergency care if necessary.   Patient understands and agrees to follow  directions and advice.      ----------------------------------------- PATIENT INSTRUCTIONS-----------------------------------------   .    Patient Instructions   1.  Since you have already picked up the 2.5 mg keep those for now and maintain your current cycle however I will up you to 5 mg and you will start that as soon as it is ready.  Therefore even if you have taken only 1 dose of the 2.5 and your 5 mg is ready increase to 5 mg and we will end up using the 2.5 to either make 5 mg or 7.5 so you do not waste these.  After you finish 4 doses of the 5 mg we will actually jump to 10 mg as a prescription which I will give you today but 7.5 mg is the actual next dose so that is a possibility with the meds you have left over.     Once you have taken 2 or 3 dosages of the 5 mg and as long as you are feeling fine just send me a [employer] message telling me you are going up to 10 and then were done.

## 2024-09-21 ENCOUNTER — PATIENT MESSAGE (OUTPATIENT)
Facility: LOCATION | Age: 64
End: 2024-09-21

## 2024-09-23 ENCOUNTER — TELEPHONE (OUTPATIENT)
Facility: LOCATION | Age: 64
End: 2024-09-23

## 2024-09-23 NOTE — TELEPHONE ENCOUNTER
From: Andres Kang  To: Mauricio Geiger  Sent: 9/21/2024 10:28 AM CDT  Subject: Mounjaro 5MG - Increase prescription    Dr Geiger    I need you and your office to contact my insurance to help them with covering the change to 5MG    The last 2 Fridays, I increased the dosage and took 2 - 2.5 MG dosages so I currently do bot have a any of this to start on Friday 9/27 (I am also leaving on vacation starting that day and return 10/7    This insurance company told me that if you contacted them and explained this that they would fill it and start it now. You already gave them a presription but may need to send a new one to start now    What will we do when on 10/11 we want to start the dosage of 7.5 MG    [facility] [phone] Cover my Meds  Thanks for doin this so I get this by Thur 9/26    Andres Kang  [phone]

## 2024-09-23 NOTE — TELEPHONE ENCOUNTER
Patient has been obtaining Mounjaro 2.5mg last refill was 9/7/24    Now states he needs prior auth

## 2024-09-24 NOTE — TELEPHONE ENCOUNTER
Approval Details    Authorization number: [id number]  Authorized from September 24, 2024 to September 24, 2025    Patient notified via [employer].

## 2024-11-14 ENCOUNTER — NURSE TRIAGE (OUTPATIENT)
Facility: LOCATION | Age: 64
End: 2024-11-14

## 2024-11-14 ENCOUNTER — LAB ENCOUNTER (OUTPATIENT)
Dept: LAB | Age: 64
End: 2024-11-14
Attending: INTERNAL MEDICINE
Payer: COMMERCIAL

## 2024-11-14 DIAGNOSIS — M1A.9XX0 CHRONIC GOUT WITHOUT TOPHUS, UNSPECIFIED CAUSE, UNSPECIFIED SITE: ICD-10-CM

## 2024-11-14 DIAGNOSIS — E11.9 TYPE 2 DIABETES MELLITUS WITHOUT COMPLICATION, WITHOUT LONG-TERM CURRENT USE OF INSULIN (HCC): ICD-10-CM

## 2024-11-14 LAB
ALBUMIN SERPL-MCNC: 4.5 G/DL (ref 3.2–4.8)
ALBUMIN/GLOB SERPL: 1.8 {RATIO} (ref 1–2)
ALP LIVER SERPL-CCNC: 51 U/L
ALT SERPL-CCNC: 34 U/L
ANION GAP SERPL CALC-SCNC: 7 MMOL/L (ref 0–18)
AST SERPL-CCNC: 18 U/L (ref ?–34)
BILIRUB SERPL-MCNC: 0.6 MG/DL (ref 0.2–1.1)
BUN BLD-MCNC: 21 MG/DL (ref 9–23)
BUN/CREAT SERPL: 20.2 (ref 10–20)
CALCIUM BLD-MCNC: 9.7 MG/DL (ref 8.7–10.4)
CHLORIDE SERPL-SCNC: 101 MMOL/L (ref 98–112)
CHOLEST SERPL-MCNC: 101 MG/DL (ref ?–200)
CO2 SERPL-SCNC: 29 MMOL/L (ref 21–32)
CREAT BLD-MCNC: 1.04 MG/DL
CREAT UR-SCNC: 40 MG/DL
EGFRCR SERPLBLD CKD-EPI 2021: 81 ML/MIN/1.73M2 (ref 60–?)
EST. AVERAGE GLUCOSE BLD GHB EST-MCNC: 126 MG/DL (ref 68–126)
FASTING PATIENT LIPID ANSWER: NO
FASTING STATUS PATIENT QL REPORTED: NO
GLOBULIN PLAS-MCNC: 2.5 G/DL (ref 2–3.5)
GLUCOSE BLD-MCNC: 201 MG/DL (ref 70–99)
HBA1C MFR BLD: 6 % (ref ?–5.7)
HDLC SERPL-MCNC: 44 MG/DL (ref 40–59)
LDLC SERPL CALC-MCNC: 40 MG/DL (ref ?–100)
MICROALBUMIN UR-MCNC: <0.3 MG/DL
NONHDLC SERPL-MCNC: 57 MG/DL (ref ?–130)
OSMOLALITY SERPL CALC.SUM OF ELEC: 293 MOSM/KG (ref 275–295)
POTASSIUM SERPL-SCNC: 4 MMOL/L (ref 3.5–5.1)
PROT SERPL-MCNC: 7 G/DL (ref 5.7–8.2)
SODIUM SERPL-SCNC: 137 MMOL/L (ref 136–145)
TRIGL SERPL-MCNC: 85 MG/DL (ref 30–149)
URATE SERPL-MCNC: 7.3 MG/DL
VLDLC SERPL CALC-MCNC: 12 MG/DL (ref 0–30)

## 2024-11-14 PROCEDURE — 82043 UR ALBUMIN QUANTITATIVE: CPT

## 2024-11-14 PROCEDURE — 80053 COMPREHEN METABOLIC PANEL: CPT

## 2024-11-14 PROCEDURE — 36415 COLL VENOUS BLD VENIPUNCTURE: CPT

## 2024-11-14 PROCEDURE — 82570 ASSAY OF URINE CREATININE: CPT

## 2024-11-14 PROCEDURE — 80061 LIPID PANEL: CPT

## 2024-11-14 PROCEDURE — 84550 ASSAY OF BLOOD/URIC ACID: CPT

## 2024-11-14 PROCEDURE — 83036 HEMOGLOBIN GLYCOSYLATED A1C: CPT

## 2024-11-14 RX ORDER — ALLOPURINOL 100 MG/1
100 TABLET ORAL DAILY
Qty: 90 TABLET | Refills: 5 | Status: SHIPPED | OUTPATIENT
Start: 2024-11-14 | End: 2024-11-18

## 2024-11-14 RX ORDER — PREDNISONE 20 MG/1
20 TABLET ORAL 2 TIMES DAILY WITH MEALS
Qty: 14 TABLET | Refills: 0 | Status: SHIPPED | OUTPATIENT
Start: 2024-11-14 | End: 2024-11-18

## 2024-11-14 NOTE — TELEPHONE ENCOUNTER
Action Requested: Summary for Provider     []  Critical Lab, Recommendations Needed  [] Need Additional Advice  []   FYI    []   Need Orders  [] Need Medications Sent to Pharmacy  []  Other     SUMMARY:left knee pain was acute Friday, now less, more in right top of foot/ great toe-worse ever, didn't sleep much, requesting Uric acid lab, reviewed Chart, advised lab pending from September- was supposed to be repeated in October ,will go today , mentioned he takes 10mg Mounjaro - asking if need to be increased and Diuretics feels it related to this        Reason for call: Gout  Onset:Friday                    Reason for Disposition   Patient wants to be seen    Protocols used: Foot Pain-A-[place]

## 2024-11-15 NOTE — TELEPHONE ENCOUNTER
Spoke with Patient and Verified  and Last Name and Patient did see [email or web address] Message and Made a Video Visit for Monday. No other questions at this time.

## 2024-11-15 NOTE — TELEPHONE ENCOUNTER
Would recommend follow-up video visit on Monday as there is no openings right now.  Will send [employer] message to patient.

## 2024-11-18 ENCOUNTER — TELEMEDICINE (OUTPATIENT)
Facility: LOCATION | Age: 64
End: 2024-11-18
Payer: COMMERCIAL

## 2024-11-18 VITALS — SYSTOLIC BLOOD PRESSURE: 136 MMHG | DIASTOLIC BLOOD PRESSURE: 70 MMHG

## 2024-11-18 DIAGNOSIS — E78.5 HYPERLIPIDEMIA ASSOCIATED WITH TYPE 2 DIABETES MELLITUS (HCC): ICD-10-CM

## 2024-11-18 DIAGNOSIS — E11.69 HYPERLIPIDEMIA ASSOCIATED WITH TYPE 2 DIABETES MELLITUS (HCC): ICD-10-CM

## 2024-11-18 DIAGNOSIS — I15.2 HYPERTENSION ASSOCIATED WITH TYPE 2 DIABETES MELLITUS (HCC): ICD-10-CM

## 2024-11-18 DIAGNOSIS — E11.9 TYPE 2 DIABETES MELLITUS WITHOUT COMPLICATION, WITHOUT LONG-TERM CURRENT USE OF INSULIN (HCC): ICD-10-CM

## 2024-11-18 DIAGNOSIS — M1A.9XX0 CHRONIC GOUT WITHOUT TOPHUS, UNSPECIFIED CAUSE, UNSPECIFIED SITE: Primary | ICD-10-CM

## 2024-11-18 DIAGNOSIS — E11.59 HYPERTENSION ASSOCIATED WITH TYPE 2 DIABETES MELLITUS (HCC): ICD-10-CM

## 2024-11-18 RX ORDER — TIRZEPATIDE 15 MG/.5ML
15 INJECTION, SOLUTION SUBCUTANEOUS WEEKLY
Qty: 6 ML | Refills: 11 | Status: SHIPPED | OUTPATIENT
Start: 2024-12-02

## 2024-11-18 RX ORDER — TIRZEPATIDE 12.5 MG/.5ML
12.5 INJECTION, SOLUTION SUBCUTANEOUS WEEKLY
Qty: 2 ML | Refills: 1 | Status: SHIPPED | OUTPATIENT
Start: 2024-11-18

## 2024-11-18 RX ORDER — TELMISARTAN 80 MG/1
80 TABLET ORAL NIGHTLY
Qty: 90 TABLET | Refills: 9 | Status: SHIPPED | OUTPATIENT
Start: 2024-11-18

## 2024-11-18 RX ORDER — EPLERENONE 25 MG/1
25 TABLET, FILM COATED ORAL DAILY
Qty: 90 TABLET | Refills: 2 | Status: SHIPPED | OUTPATIENT
Start: 2024-11-18

## 2024-11-18 RX ORDER — NEBIVOLOL 10 MG/1
10 TABLET ORAL DAILY
Qty: 90 TABLET | Refills: 9 | Status: SHIPPED | OUTPATIENT
Start: 2024-11-18

## 2024-11-18 RX ORDER — ROSUVASTATIN CALCIUM 5 MG/1
5 TABLET, COATED ORAL NIGHTLY
Qty: 90 TABLET | Refills: 9 | Status: SHIPPED | OUTPATIENT
Start: 2024-11-18

## 2024-11-18 RX ORDER — ALLOPURINOL 200 MG/1
200 TABLET ORAL DAILY
Qty: 180 TABLET | Refills: 1 | Status: SHIPPED | OUTPATIENT
Start: 2024-11-18

## 2024-11-18 NOTE — PROGRESS NOTES
INTERNAL MEDICINE VIDEO VISIT NOTE     Patient ID: Andres Kang is a 64 year old male.  Today's Date: 11/18/24  HPI  1.  He has type 2 diabetes, is currently on Mounjaro 10 mg, his A1c is improved down to 6.0, no complaints on this medication.  He has lost about 10 pounds.    2.  Hypertension associated type 2 diabetes, blood pressure is well-controlled on multiple agents as noted below.    3.  He has dyslipidemia associated type 2 diabetes, LDL well-controlled on rosuvastatin 5, no complaints.    4.  He has gout, his uric acid was previously stalled at 7.3, he has been on allopurinol 100 mg, he did well without any flares however this past week and he had a severe flare, we were able to treat him with prednisone and his symptoms did improve after few days.  We reviewed his medications and advised that chlorthalidone may be triggering this.    Vitals:    11/18/24 1520   BP: 136/70     body mass index is unknown because there is no height or weight on file.  BP Readings from Last 3 Encounters:   11/18/24 136/70   09/12/24 146/78   08/09/24 134/77     The ASCVD Risk score (Joseph DK, et al., 2019) failed to calculate for the following reasons:    The valid total cholesterol range is 130 to 320 mg/dL  Medications reviewed:  Current Outpatient Medications   Medication Sig Dispense Refill    Tirzepatide (MOUNJARO) 12.5 MG/0.5ML Subcutaneous Solution Auto-injector Inject 12.5 mg into the skin once a week. 2 mL 1    [START ON 12/2/2024] Tirzepatide (MOUNJARO) 15 MG/0.5ML Subcutaneous Solution Auto-injector Inject 15 mg into the skin once a week. 6 mL 11    eplerenone 25 MG Oral Tab Take 1 tablet (25 mg total) by mouth daily. FOR BLOOD PRESSURE. STOP CHLORTHALIDONE. 90 tablet 2    allopurinol 200 MG Oral Tab Take 200 mg by mouth daily. FOR GOUT. 180 tablet 1    telmisartan 80 MG Oral Tab Take 1 tablet (80 mg total) by mouth at bedtime. FOR BLOOD PRESSURE. 90 tablet 9    rosuvastatin 5 MG Oral Tab Take 1  tablet (5 mg total) by mouth nightly. FOR CHOLESTEROL. 90 tablet 9    nebivolol (BYSTOLIC) 10 MG Oral Tab Take 1 tablet (10 mg total) by mouth daily. FOR BLOOD PRESSURE. 90 tablet 9    omeprazole 20 MG Oral Capsule Delayed Release Take 1 capsule (20 mg total) by mouth every morning. FOR ACID REFLUX. 90 capsule 6    albuterol 108 (90 Base) MCG/ACT Inhalation Aero Soln Inhale 2 puffs into the lungs every 4 (four) hours as needed for Wheezing or Shortness of Breath. 3 each 5    HYDROcodone-acetaminophen (NORCO)  MG Oral Tab Take 1 tablet by mouth every 6 (six) hours as needed for Pain. 10 tablet 0    fluticasone-salmeterol (ADVAIR DISKUS) 250-50 MCG/ACT Inhalation Aerosol Powder, Breath Activated Inhale 1 puff into the lungs every 12 (twelve) hours. 3 each 9         Assessment & Plan    1. Chronic gout without tophus, unspecified cause, unspecified site (Primary)  -     XR FOOT (2 VIEW), LEFT (CPT=73620); Future; Expected date: 11/18/2024  -     XR FOOT (2 VIEW), RIGHT (CPT=73620); Future; Expected date: 11/18/2024  -     Allopurinol; Take 200 mg by mouth daily. FOR GOUT.  Dispense: 180 tablet; Refill: 1  -     Uric Acid; Future; Expected date: 11/18/2024  -     Comp Metabolic Panel (14); Future; Expected date: 11/18/2024  2. Type 2 diabetes mellitus without complication, without long-term current use of insulin (HCC)  -     Mounjaro; Inject 12.5 mg into the skin once a week.  Dispense: 2 mL; Refill: 1  -     Mounjaro; Inject 15 mg into the skin once a week.  Dispense: 6 mL; Refill: 11  -     Telmisartan; Take 1 tablet (80 mg total) by mouth at bedtime. FOR BLOOD PRESSURE.  Dispense: 90 tablet; Refill: 9  -     Rosuvastatin Calcium; Take 1 tablet (5 mg total) by mouth nightly. FOR CHOLESTEROL.  Dispense: 90 tablet; Refill: 9  3. Hypertension associated with type 2 diabetes mellitus (HCC)  -     Eplerenone; Take 1 tablet (25 mg total) by mouth daily. FOR BLOOD PRESSURE. STOP CHLORTHALIDONE.  Dispense: 90 tablet;  Refill: 2  -     Telmisartan; Take 1 tablet (80 mg total) by mouth at bedtime. FOR BLOOD PRESSURE.  Dispense: 90 tablet; Refill: 9  -     Nebivolol HCl; Take 1 tablet (10 mg total) by mouth daily. FOR BLOOD PRESSURE.  Dispense: 90 tablet; Refill: 9  4. Hyperlipidemia associated with type 2 diabetes mellitus (HCC)  -     Rosuvastatin Calcium; Take 1 tablet (5 mg total) by mouth nightly. FOR CHOLESTEROL.  Dispense: 90 tablet; Refill: 9  PLan  In terms of the gout his uric acid is still 7.3, I suspect the chlorthalidone may be precipitating this, we will go ahead and switch to eplerenone and monitor closely, blood pressure goal the same as what it typically is.  Terms of Mounjaro continue to increase upwards of 15 mg.  Increase the allopurinol to 200 mg and try to get the uric acid below 5-6, he may need to go up to 300 mg we will see how he does over the next few weeks.  Continue remainder of above medications.  Get labs before follow-up visit.    Follow Up: Return for OFFICE OR VIDEO VISIT- 1 month BP, uric acid/gout..         Objective: Results:   Physical Exam  Nursing note reviewed.   Constitutional:       General: He is not in acute distress.     Appearance: Normal appearance.   HENT:      Head: Normocephalic and atraumatic.      Right Ear: External ear normal.      Left Ear: External ear normal.      Nose: Nose normal.   Eyes:      Conjunctiva/sclera: Conjunctivae normal.   Pulmonary:      Effort: Pulmonary effort is normal. No respiratory distress.   Musculoskeletal:      Cervical back: Normal range of motion and neck supple.   Skin:     Coloration: Skin is not jaundiced.   Neurological:      General: No focal deficit present.      Mental Status: He is alert and oriented to person, place, and time. Mental status is at baseline.   Psychiatric:         Mood and Affect: Mood normal.         Thought Content: Thought content normal.        Reviewed:  Patient Active Problem List    Diagnosis    Hyperlipidemia  associated with type 2 diabetes mellitus (HCC)    Type 2 diabetes mellitus without complication, without long-term current use of insulin (HCC)    Chronic gout without tophus    FAY on CPAP     CPAP      Hypertension associated with type 2 diabetes mellitus (HCC)    GERD (gastroesophageal reflux disease)      Allergies[1]     Social History     Socioeconomic History    Marital status:    Tobacco Use    Smoking status: Never     Passive exposure: Never    Smokeless tobacco: Never   Vaping Use    Vaping status: Never Used   Substance and Sexual Activity    Alcohol use: Yes     Alcohol/week: 1.0 standard drink of alcohol     Types: 1 Standard drinks or equivalent per week     Comment: social    Drug use: No   Other Topics Concern    Caffeine Concern Yes     Comment: Coffee, Soda 3 cups daily;       Review of Systems  All other systems negative unless otherwise stated in ROS or HPI above.       Mauricio Geiger MD  Internal Medicine       Call office with any questions or seek emergency care if necessary.   Patient understands and agrees to follow directions and advice.    Telehealth Verbal Consent   I conducted a telehealth visit with Andres Kang today, 11/18/24, which was completed using two-way, real-time interactive audio and video communication. This has been done in good keila to provide continuity of care in the best interest of the provider-patient relationship, due to the COVID -19 public health crisis/national emergency where restrictions of face-to-face office visits are ongoing. Every conscious effort was taken to allow for sufficient and adequate time to complete the visit.The patient was made aware of the limitations of the telehealth visit, including treatment limitations as no physical exam could be performed.  The patient was advised to call 911 or to go to the ER in case there was an emergency.  The patient was also advised of the potential privacy & security concerns related to the telehealth  platform. The patient was made aware of where to find [facility]'s notice of privacy practices, telehealth consent form and other related consent forms and documents.  which are located on the [facility] website. The patient verbally agreed to telehealth consent form, related consents and the risks discussed.  Lastly, the patient confirmed that they were in [place]. Included in this visit, time may have been spent reviewing labs, medications, radiology tests and decision making. Appropriate medical decision-making and tests are ordered as detailed in the plan of care above.  Coding/billing information is submitted for this visit based on complexity of care and/or time spent for the visit.  ----------------------------------------- PATIENT INSTRUCTIONS-----------------------------------------     There are no Patient Instructions on file for this visit.           [1] No Known Allergies

## 2024-12-09 DIAGNOSIS — M1A.9XX0 CHRONIC GOUT WITHOUT TOPHUS, UNSPECIFIED CAUSE, UNSPECIFIED SITE: ICD-10-CM

## 2024-12-10 ENCOUNTER — PATIENT MESSAGE (OUTPATIENT)
Facility: LOCATION | Age: 64
End: 2024-12-10

## 2024-12-10 ENCOUNTER — TELEPHONE (OUTPATIENT)
Dept: INTERNAL MEDICINE CLINIC | Facility: CLINIC | Age: 64
End: 2024-12-10

## 2024-12-10 DIAGNOSIS — M1A.9XX0 CHRONIC GOUT WITHOUT TOPHUS, UNSPECIFIED CAUSE, UNSPECIFIED SITE: Primary | ICD-10-CM

## 2024-12-10 RX ORDER — PREDNISONE 20 MG/1
20 TABLET ORAL 2 TIMES DAILY WITH MEALS
Qty: 14 TABLET | Refills: 2 | Status: SHIPPED | OUTPATIENT
Start: 2024-12-10 | End: 2024-12-17

## 2024-12-10 NOTE — TELEPHONE ENCOUNTER
\"I NEED TO GET A REFILL OF THE PREDNISONE. I THOUGHT THE LAST TIME I SAW YOU, THAT YOU DID RENEW THIS BUT THERE IS NO REFILL AVAILABLE  I NEED THIS AS IT GIVES ME RELIEF WHEN I START TO GET THESE GOUT FLAREUPS  PLEASE APPROVE THIS ASAP AS THE [facility] HAS ALSO REQUESTED THIS FROM YOU  THANKS  JEFF IVAN    Patient's last office visit for chronic gout was 11/18/24.  Last prescription prednisone was 12/21/23.  Please advise.

## 2024-12-12 RX ORDER — PREDNISONE 20 MG/1
20 TABLET ORAL 2 TIMES DAILY WITH MEALS
Qty: 14 TABLET | Refills: 0 | OUTPATIENT
Start: 2024-12-12

## 2025-03-20 ENCOUNTER — TELEPHONE (OUTPATIENT)
Facility: LOCATION | Age: 65
End: 2025-03-20

## 2025-03-20 DIAGNOSIS — Z12.11 COLON CANCER SCREENING: Primary | ICD-10-CM

## 2025-03-20 DIAGNOSIS — E11.9 TYPE 2 DIABETES MELLITUS WITHOUT COMPLICATION, WITHOUT LONG-TERM CURRENT USE OF INSULIN (HCC): ICD-10-CM

## 2025-03-20 NOTE — TELEPHONE ENCOUNTER
Please relay to patient or Listed contact if unable to reach:  Jey Geiger is unfortunately no longer with our organization,  My name is Dr. Mendoza and I'm located in [facility] and assigned to his former patients thru [facility].   I am reaching out in regards to remind you that  [facility], Diabetes Follow up, You are also due for diabetes eye exam, If you do not have cataracts or floaters we can do a retinal screening in office in our [place] location, however if you have vision problems, I can place a referral to ophthalmologist if you don't already have one. If you already do have one please make sure that they please send a  Fax of your annual retinal screen to our office fax number: [phone] , and Colorectal cancer screening: given your age of greater than 45 I highly recommend you get your colorectal cancer screening if you decline screening colonoscopy a non invasive option is a stool Cologuard test which is done every three years. For average risk patients.  I have placed an order for Cologuard which detects stool sample for cancer was placed and will be shipped to your home within the next 48 hours and has a return UPS pre-paid postage to send back in the mail to schedule a online pickup from home or UPS store Please complete this within the next week to ensure Dr. Mendoza is delivering High quality/value care to his patients. If you have any questions, please schedule follow up with me  If you have already had a colonoscopy or Cologuard recently completed, please send Fax the records to our office [phone] However if you have established care with another provider please call our office [phone] or send us a "[employer] message and we will remove your name from our list to indicate you have a new provider and will gladly send records to them if requested.   A Video on how to collect the cologuard sample on  youtube: titled: How to properly use Cologuard colon cancer at-home test kit https://www.youtube.com/watch?[email or web address]    Please complete this within the next month as Dr. Mendoza values providing high value evidence based medical care and prevention and would like to go over the next steps needed In your wellness endeavor.     However if you have established care with another provider please call our office [phone] or send us a [employer] message and we will remove your name from our list to indicate you have a new provider and will gladly send records to them if requested.      Wish you all the best in your endeavor for better health  -Dr. David Mendoza MD, MPH

## 2025-04-07 ENCOUNTER — OFFICE VISIT (OUTPATIENT)
Dept: INTERNAL MEDICINE CLINIC | Facility: CLINIC | Age: 65
End: 2025-04-07
Payer: COMMERCIAL

## 2025-04-07 VITALS
DIASTOLIC BLOOD PRESSURE: 78 MMHG | OXYGEN SATURATION: 98 % | SYSTOLIC BLOOD PRESSURE: 132 MMHG | HEART RATE: 75 BPM | HEIGHT: 70 IN | WEIGHT: 270.63 LBS | TEMPERATURE: 98 F | BODY MASS INDEX: 38.75 KG/M2

## 2025-04-07 DIAGNOSIS — Z12.11 COLON CANCER SCREENING: ICD-10-CM

## 2025-04-07 DIAGNOSIS — M1A.9XX0 CHRONIC GOUT WITHOUT TOPHUS, UNSPECIFIED CAUSE, UNSPECIFIED SITE: Primary | ICD-10-CM

## 2025-04-07 RX ORDER — PREDNISONE 20 MG/1
40 TABLET ORAL DAILY
Qty: 10 TABLET | Refills: 0 | Status: SHIPPED | OUTPATIENT
Start: 2025-04-07 | End: 2025-04-12

## 2025-04-07 RX ORDER — INDOMETHACIN 50 MG/1
50 CAPSULE ORAL ONCE
Qty: 1 CAPSULE | Refills: 0 | Status: SHIPPED | OUTPATIENT
Start: 2025-04-07 | End: 2025-04-07

## 2025-04-07 RX ORDER — PREDNISONE 20 MG/1
TABLET ORAL
COMMUNITY
Start: 2025-03-03

## 2025-04-07 NOTE — PROGRESS NOTES
Subjective:   Andres Kang is a 64 year old male with PMH HTN, HL, T2DM, GERD, chronic gout, FAY on CPAP who presents for Knee Pain (LT knee pain, GOUT)     Former patient of Dr. Geiger  Has been having issues with his L knee / gout flare since last week Tuesday  Started taking prednisone twice daily, ibuprofen, and norco.  Improved a bit by Friday, then went to a [employer] game Friday, had unbearable pain over the weekend.   Almost unbearable pain over the weekend  Feeling better today - pain is 3-4/10.  Taking allopurinol daily.  A friend has gout and gave him a medicine - indomethacin - worked great, wondering if he should try that medication for future flares.    Diet includes a couple of drinks a couple times weekly (beer, rum), processed meats.     Additionally, he received a FIT test in the mail re: care gaps, and an email from Dr. Mendoza encouraging him to schedule an appointment. He wishes to see a new physician at this location due to convenience of location.      History/Other:    Chief Complaint Reviewed and Verified  Nursing Notes Reviewed and   Verified  Tobacco Reviewed  Allergies Reviewed  Medications Reviewed    Problem List Reviewed  Medical History Reviewed  Surgical History   Reviewed  Family History Reviewed  Social History Reviewed         Tobacco:  He has never smoked tobacco.    Current Outpatient Medications   Medication Sig Dispense Refill    predniSONE 20 MG Oral Tab       indomethacin 50 MG Oral Cap Take 1 capsule (50 mg total) by mouth one time for 1 dose. 1 capsule 0    predniSONE 20 MG Oral Tab Take 2 tablets (40 mg total) by mouth daily for 5 days. For gout flare 10 tablet 0    Tirzepatide (MOUNJARO) 15 MG/0.5ML Subcutaneous Solution Auto-injector Inject 15 mg into the skin once a week. 6 mL 11    eplerenone 25 MG Oral Tab Take 1 tablet (25 mg total) by mouth daily. FOR BLOOD PRESSURE. STOP CHLORTHALIDONE. 90 tablet 2    allopurinol 200 MG Oral Tab Take 200 mg by mouth daily.  FOR GOUT. 180 tablet 1    telmisartan 80 MG Oral Tab Take 1 tablet (80 mg total) by mouth at bedtime. FOR BLOOD PRESSURE. 90 tablet 9    rosuvastatin 5 MG Oral Tab Take 1 tablet (5 mg total) by mouth nightly. FOR CHOLESTEROL. 90 tablet 9    nebivolol (BYSTOLIC) 10 MG Oral Tab Take 1 tablet (10 mg total) by mouth daily. FOR BLOOD PRESSURE. 90 tablet 9    omeprazole 20 MG Oral Capsule Delayed Release Take 1 capsule (20 mg total) by mouth every morning. FOR ACID REFLUX. 90 capsule 6    albuterol 108 (90 Base) MCG/ACT Inhalation Aero Soln Inhale 2 puffs into the lungs every 4 (four) hours as needed for Wheezing or Shortness of Breath. 3 each 5    HYDROcodone-acetaminophen (NORCO)  MG Oral Tab Take 1 tablet by mouth every 6 (six) hours as needed for Pain. 10 tablet 0    Tirzepatide (MOUNJARO) 12.5 MG/0.5ML Subcutaneous Solution Auto-injector Inject 12.5 mg into the skin once a week. (Patient not taking: Reported on 4/7/2025) 2 mL 1    fluticasone-salmeterol (ADVAIR DISKUS) 250-50 MCG/ACT Inhalation Aerosol Powder, Breath Activated Inhale 1 puff into the lungs every 12 (twelve) hours. (Patient not taking: Reported on 4/7/2025) 3 each 9         Review of Systems:  Review of Systems  10 point review of systems otherwise negative with the exception of HPI and assessment and plan.    Objective:   /78   Pulse 75   Temp 98.1 °F (36.7 °C) (Temporal)   Ht 5' 10\" (1.778 m)   Wt 270 lb 9.6 oz (122.7 kg)   SpO2 98%   BMI 38.83 kg/m²  Estimated body mass index is 38.83 kg/m² as calculated from the following:    Height as of this encounter: 5' 10\" (1.778 m).    Weight as of this encounter: 270 lb 9.6 oz (122.7 kg).      Physical Exam  Vitals reviewed.   Constitutional:       Appearance: He is not ill-appearing or toxic-appearing.   Cardiovascular:      Rate and Rhythm: Normal rate.   Pulmonary:      Effort: Pulmonary effort is normal. No respiratory distress.   Musculoskeletal:      Left knee: Swelling and erythema  present.      Comments: Mild erythema, warmth, subtle swelling over proximal L knee. No effusion, no tracking redness. No decreased ROM.   Skin:     General: Skin is warm and dry.   Neurological:      Mental Status: He is alert.         Assessment & Plan:   1. Chronic gout without tophus, unspecified cause, unspecified site (Primary)  -     Indomethacin; Take 1 capsule (50 mg total) by mouth one time for 1 dose.  Dispense: 1 capsule; Refill: 0  -     predniSONE; Take 2 tablets (40 mg total) by mouth daily for 5 days. For gout flare  Dispense: 10 tablet; Refill: 0  - Provided with rx for prednisone to have on hand for next flare - provided with instructions on proper use - 40mg daily in the morning with food for a full 5 days.  - Recent LFTS, renal function WNL, last A1C 6.0 - prescribed indomethacin - instructed on proper use. Not to exceed 5-7 days - if flare persists beyond that time period he should be evaluated in person to r/o infectious process. Printed medication information provided and reviewed gout lifestyle modifications.  2. Colon cancer screening  -     [facility] GI Telephone Colon Screen; Future; Expected date: 04/14/2025  - Provided with referral for phone screen as his colonoscopy is overdue and they are booking several months out.    Instructed to schedule appointment for annual physical - due August - will schedule with Dr. Osorio.        Return in about 4 months (around 8/9/2025) for annual physical.    JENNIFER Vasquez, 4/7/2025, 9:31 AM     This note was prepared using Dragon Medical voice recognition dictation software. As a result errors may occur. When identified, these errors have been corrected. While every attempt is made to correct errors during dictation discrepancies may still exist.

## 2025-04-07 NOTE — PATIENT INSTRUCTIONS
Prednisone - when you have an acute flare, take 40mg in the morning with food for 5 days straight. There shouldn't be pills leftover, and you need to complete the entire course of 5 days for this to be effective.    Indomethacin - take 50mg 3 times daily within 24-48 hours of a flare beginning. Stop taking this medication after symptoms subside, but no longer than 5-7 days. Don't take any additional NSAID medications while taking this (includes ibuprofen, motrin, aleve, naproxen).    Call to schedule an Annual Physical with Dr. Osorio around August.

## 2025-04-08 ENCOUNTER — TELEPHONE (OUTPATIENT)
Dept: INTERNAL MEDICINE CLINIC | Facility: CLINIC | Age: 65
End: 2025-04-08

## 2025-04-08 ENCOUNTER — PATIENT MESSAGE (OUTPATIENT)
Dept: INTERNAL MEDICINE CLINIC | Facility: CLINIC | Age: 65
End: 2025-04-08

## 2025-04-08 NOTE — TELEPHONE ENCOUNTER
See telephone encounter from today as well    JENNIFER Chan - advise if any, if no advice then sign encounter. Thank you.

## 2025-04-08 NOTE — TELEPHONE ENCOUNTER
On-call note:    Received after hours page on 4/8/2025 at 1745.    Problem: Gout medication not at pharmacy. Was sent by me this morning.    Recommendations: I resent the rx and called the pharmacy to confirm receipt.    Follow up: they received the prescription and will fill it for him. Patient notified.

## 2025-04-08 NOTE — TELEPHONE ENCOUNTER
[Last office visit was yesterday with JENNIFER Castillo]    Patient called states he had picked up Rx and they only dispensed #1 capsule and he states he was told at office visit, Rx should be taken three times a day. He thinks he should have gotten more than 1 capsule. He did also  the prednisone and taking as prescribed. I made him aware I will convey the above to JENNIFER Castillo. Patient verbalized understanding. No further questions or concerns at this time.    JENNIFER Castillo - please advise

## 2025-04-09 ENCOUNTER — TELEPHONE (OUTPATIENT)
Dept: INTERNAL MEDICINE CLINIC | Facility: CLINIC | Age: 65
End: 2025-04-09

## 2025-04-09 NOTE — TELEPHONE ENCOUNTER
Looks to be healing well. Continue dressing changes and antibiotic ointment per ED recs. Also recommend continuing previously prescribed Keflex until completion of course. Given severity of wound would recommend follow up in my clinic on 2/5/20 to see how healing has been going.    Medications - Current[1]     indomethacin 50 MG Oral Cap, Take 1 capsule (50 mg total) by mouth in the morning, at noon, and at bedtime for 21 doses. For acute gout flare, take 1 tablet 3 times daily until symptoms have resolved, but for no longer than 5 days., Disp: 21 capsule, Rfl: 0        [1]   Current Outpatient Medications:     indomethacin 50 MG Oral Cap, Take 1 capsule (50 mg total) by mouth in the morning, at noon, and at bedtime for 21 doses. For acute gout flare, take 1 tablet 3 times daily until symptoms have resolved, but for no longer than 5 days., Disp: 21 capsule, Rfl: 0    predniSONE 20 MG Oral Tab, , Disp: , Rfl:     predniSONE 20 MG Oral Tab, Take 2 tablets (40 mg total) by mouth daily for 5 days. For gout flare, Disp: 10 tablet, Rfl: 0    Tirzepatide (MOUNJARO) 12.5 MG/0.5ML Subcutaneous Solution Auto-injector, Inject 12.5 mg into the skin once a week. (Patient not taking: Reported on 4/7/2025), Disp: 2 mL, Rfl: 1    Tirzepatide (MOUNJARO) 15 MG/0.5ML Subcutaneous Solution Auto-injector, Inject 15 mg into the skin once a week., Disp: 6 mL, Rfl: 11    eplerenone 25 MG Oral Tab, Take 1 tablet (25 mg total) by mouth daily. FOR BLOOD PRESSURE. STOP CHLORTHALIDONE., Disp: 90 tablet, Rfl: 2    allopurinol 200 MG Oral Tab, Take 200 mg by mouth daily. FOR GOUT., Disp: 180 tablet, Rfl: 1    telmisartan 80 MG Oral Tab, Take 1 tablet (80 mg total) by mouth at bedtime. FOR BLOOD PRESSURE., Disp: 90 tablet, Rfl: 9    rosuvastatin 5 MG Oral Tab, Take 1 tablet (5 mg total) by mouth nightly. FOR CHOLESTEROL., Disp: 90 tablet, Rfl: 9    nebivolol (BYSTOLIC) 10 MG Oral Tab, Take 1 tablet (10 mg total) by mouth daily. FOR BLOOD PRESSURE., Disp: 90 tablet, Rfl: 9    omeprazole 20 MG Oral Capsule Delayed Release, Take 1 capsule (20 mg total) by mouth every morning. FOR ACID REFLUX., Disp: 90 capsule, Rfl: 6    albuterol 108 (90 Base) MCG/ACT Inhalation Aero Soln, Inhale 2 puffs into the lungs every 4 (four) hours as  needed for Wheezing or Shortness of Breath., Disp: 3 each, Rfl: 5    HYDROcodone-acetaminophen (NORCO)  MG Oral Tab, Take 1 tablet by mouth every 6 (six) hours as needed for Pain., Disp: 10 tablet, Rfl: 0    fluticasone-salmeterol (ADVAIR DISKUS) 250-50 MCG/ACT Inhalation Aerosol Powder, Breath Activated, Inhale 1 puff into the lungs every 12 (twelve) hours. (Patient not taking: Reported on 4/7/2025), Disp: 3 each, Rfl: 9

## 2025-04-10 ENCOUNTER — PATIENT MESSAGE (OUTPATIENT)
Dept: RHEUMATOLOGY | Facility: CLINIC | Age: 65
End: 2025-04-10

## 2025-04-11 NOTE — TELEPHONE ENCOUNTER
Patient called back.  He states that he was not informed of any Questionnaires but will try to complete them prior to phone call at 12 pm today.

## 2025-05-05 RX ORDER — PREDNISONE 20 MG/1
TABLET ORAL
Qty: 10 TABLET | Refills: 0 | Status: SHIPPED | OUTPATIENT
Start: 2025-05-05

## 2025-05-05 NOTE — TELEPHONE ENCOUNTER
Please Review. Protocol Failed; No Protocol     Requested Prescriptions   Pending Prescriptions Disp Refills    PREDNISONE 20 MG Oral Tab [Pharmacy Med Name: PREDNISONE 20MG TABLETS] 10 tablet 0     Sig: TAKE 2 TABLETS(40 MG) BY MOUTH DAILY FOR 5 DAYS FOR GOUT FLARE       There is no refill protocol information for this order

## (undated) DEVICE — LAP CHOLE: Brand: MEDLINE INDUSTRIES, INC.

## (undated) DEVICE — GOWN SURG L DISP LEV 3 AERO BLU RAGLAN SL ST

## (undated) DEVICE — ENSEAL X1 TISSUE SEALER, CURVED JAW, 37 CM SHAFT LENGTH: Brand: ENSEAL

## (undated) DEVICE — 3M™ STERI-STRIP™ REINFORCED ADHESIVE SKIN CLOSURES, R1547, 1/2 IN X 4 IN (12 MM X 100 MM), 6 STRIPS/ENVELOPE: Brand: 3M™ STERI-STRIP™

## (undated) DEVICE — TROCAR: Brand: KII SHIELDED BLADED ACCESS SYSTEM

## (undated) DEVICE — SUTURE MCRYL SZ 2-0 L36IN ABSRB UD L36MM CT-1

## (undated) DEVICE — SUTURE VCRL SZ 0 L54IN ABSRB UD POLYGLACTIN

## (undated) DEVICE — SOLUTION IRRIG 1000ML 0.9% NACL USP BTL

## (undated) DEVICE — SUTURE MCRYL SZ 3-0 L18IN ABSRB UD L19MM PS-2

## (undated) DEVICE — Device: Brand: JELCO

## (undated) DEVICE — TROCAR: Brand: KII FIOS FIRST ENTRY

## (undated) DEVICE — [HIGH FLOW INSUFFLATOR,  DO NOT USE IF PACKAGE IS DAMAGED,  KEEP DRY,  KEEP AWAY FROM SUNLIGHT,  PROTECT FROM HEAT AND RADIOACTIVE SOURCES.]: Brand: PNEUMOSURE

## (undated) DEVICE — GAMMEX® PI HYBRID SIZE 7, STERILE POWDER-FREE SURGICAL GLOVE, POLYISOPRENE AND NEOPRENE BLEND: Brand: GAMMEX

## (undated) DEVICE — INSUFFLATION NEEDLE TO ESTABLISH PNEUMOPERITONEUM.: Brand: INSUFFLATION NEEDLE

## (undated) DEVICE — Device: Brand: SUTURE PASSOR PRO

## (undated) DEVICE — ADHESIVE SKIN TOP FOR WND CLSR DERMBND ADV

## (undated) DEVICE — ADHESIVE LIQ 2/3ML VI MASTISOL

## (undated) NOTE — MR AVS SNAPSHOT
[facility] [place] [address] [id number]  [phone]               Thank you for choosing us for your health care visit with ELROY Flannery.   We are glad to serve you and happy to provide you with this summary of Commonly known as:  PRILOSEC           Tadalafil 20 MG Tabs   Take 1 tablet by mouth daily as needed for Erectile Dysfunction.    Commonly known as:  CIALIS                Where to Get Your Medications      These medications were sent to [address]

## (undated) NOTE — Clinical Note
MY apologies Sheela Byrd, wrong physician! Please ignore. Hi Dr Maciel Espitia, was wondering if there was any chance to squeeze Krystal Rendon in? APRN has performed an incision on his umbilicus for a \"blister\" however this is a hernia with fat incaceration; he is having intermittent pain but no GI symptoms. I suspect he will need some sort of intervention but he is leaving next week for a trip so I've advised him to hold off until discussion with surgery given his symptoms. [phone] if you need anything. If you are full and are ok with him seeing someone else please feel free to let patient know. I will forward to our triage to have them reach out to your service and I've asked patient to call office now for visit. Thanks! -Mauricio   MESENTERY: Small umbilical hernia containing mesenteric fat. No bowel herniation. Mild amount of inflammatory fat stranding is seen within the left para-midline mesentery with a few subcentimeter likely reactive mesenteric lymph nodes. Staff: please assist with appt, thanks.

## (undated) NOTE — Clinical Note
Hi Dr Wes Leon, was wondering if there was any chance to squeeze Kevin in? APRN has performed an incision on his umbilicus for a \"blister\" however this is a hernia with fat incaceration; he is having intermittent pain but no GI symptoms. I suspect he will need some sort of intervention but he is leaving next week for a trip so I've advised him to hold off until discussion with surgery given his symptoms. [phone] if you need anything. If you are full and are ok with him seeing someone else please feel free to let patient know. I will forward to our triage to have them reach out to your service and I've asked patient to call office now for visit. Thanks! -Mauricio   MESENTERY: Small umbilical hernia containing mesenteric fat. No bowel herniation. Mild amount of inflammatory fat stranding is seen within the left para-midline mesentery with a few subcentimeter likely reactive mesenteric lymph nodes. Staff: please assist in getting patient in to surgery asap, see above result.  Link me with any issues,thx

## (undated) NOTE — LETTER
3/22/2024          To Whom It May Concern:    Andres Kang is currently under my medical care and has been prescribed an opiate hydrocodone acetaminophen along with a steroid prednisone, these are both oral medications that have been medically prescribed by me in the [place].  Please allow Andres to travel with these internationally, this note serves as medical recommendation for these prescription drugs.    If you require additional information please contact our office.        Sincerely,    Mauricio Geiger MD          Document generated by:  Mauricio Geiger MD